# Patient Record
Sex: FEMALE | Race: ASIAN | NOT HISPANIC OR LATINO | Employment: FULL TIME | URBAN - METROPOLITAN AREA
[De-identification: names, ages, dates, MRNs, and addresses within clinical notes are randomized per-mention and may not be internally consistent; named-entity substitution may affect disease eponyms.]

---

## 2018-01-25 ENCOUNTER — CONVERSION ENCOUNTER (OUTPATIENT)
Dept: FAMILY MEDICINE CLINIC | Facility: CLINIC | Age: 38
End: 2018-01-25

## 2018-01-25 ENCOUNTER — OFFICE VISIT CONVERTED (OUTPATIENT)
Dept: FAMILY MEDICINE CLINIC | Facility: CLINIC | Age: 38
End: 2018-01-25
Attending: FAMILY MEDICINE

## 2019-04-26 ENCOUNTER — OFFICE VISIT CONVERTED (OUTPATIENT)
Dept: FAMILY MEDICINE CLINIC | Facility: CLINIC | Age: 39
End: 2019-04-26
Attending: FAMILY MEDICINE

## 2019-05-13 ENCOUNTER — HOSPITAL ENCOUNTER (OUTPATIENT)
Dept: URGENT CARE | Facility: CLINIC | Age: 39
Discharge: HOME | End: 2019-05-13
Attending: PHYSICIAN ASSISTANT

## 2019-05-31 ENCOUNTER — HOSPITAL ENCOUNTER (OUTPATIENT)
Dept: LAB | Facility: HOSPITAL | Age: 39
Discharge: HOME | End: 2019-05-31
Attending: FAMILY MEDICINE

## 2019-05-31 LAB
25(OH)D3 SERPL-MCNC: 22.4 NG/ML (ref 30–100)
ALBUMIN SERPL-MCNC: 4.1 G/DL (ref 3.5–5)
ALBUMIN/GLOB SERPL: 1.2 {RATIO} (ref 1.4–2.6)
ALP SERPL-CCNC: 60 U/L (ref 42–98)
ALT SERPL-CCNC: 14 U/L (ref 10–40)
ANION GAP SERPL CALC-SCNC: 14 MMOL/L (ref 8–19)
APPEARANCE UR: CLEAR
AST SERPL-CCNC: 17 U/L (ref 15–50)
BASOPHILS # BLD AUTO: 0.03 10*3/UL (ref 0–0.2)
BASOPHILS NFR BLD AUTO: 0.7 % (ref 0–3)
BILIRUB SERPL-MCNC: 0.57 MG/DL (ref 0.2–1.3)
BILIRUB UR QL: NEGATIVE
BUN SERPL-MCNC: 13 MG/DL (ref 5–25)
BUN/CREAT SERPL: 16 {RATIO} (ref 6–20)
CALCIUM SERPL-MCNC: 8.7 MG/DL (ref 8.7–10.4)
CHLORIDE SERPL-SCNC: 103 MMOL/L (ref 99–111)
CHOLEST SERPL-MCNC: 136 MG/DL (ref 107–200)
CHOLEST/HDLC SERPL: 1.9 {RATIO} (ref 3–6)
COLOR UR: YELLOW
CONV ABS IMM GRAN: 0.01 10*3/UL (ref 0–0.2)
CONV CO2: 25 MMOL/L (ref 22–32)
CONV COLLECTION SOURCE (UA): NORMAL
CONV IMMATURE GRAN: 0.2 % (ref 0–1.8)
CONV TOTAL PROTEIN: 7.5 G/DL (ref 6.3–8.2)
CONV UROBILINOGEN IN URINE BY AUTOMATED TEST STRIP: 0.2 {EHRLICHU}/DL (ref 0.1–1)
CREAT UR-MCNC: 0.82 MG/DL (ref 0.5–0.9)
DEPRECATED RDW RBC AUTO: 45.9 FL (ref 36.4–46.3)
EOSINOPHIL # BLD AUTO: 0.34 10*3/UL (ref 0–0.7)
EOSINOPHIL # BLD AUTO: 7.6 % (ref 0–7)
ERYTHROCYTE [DISTWIDTH] IN BLOOD BY AUTOMATED COUNT: 13 % (ref 11.7–14.4)
EST. AVERAGE GLUCOSE BLD GHB EST-MCNC: 105 MG/DL
GFR SERPLBLD BASED ON 1.73 SQ M-ARVRAT: >60 ML/MIN/{1.73_M2}
GLOBULIN UR ELPH-MCNC: 3.4 G/DL (ref 2–3.5)
GLUCOSE SERPL-MCNC: 95 MG/DL (ref 65–99)
GLUCOSE UR QL: NEGATIVE MG/DL
HBA1C MFR BLD: 13.3 G/DL (ref 12–16)
HBA1C MFR BLD: 5.3 % (ref 3.5–5.7)
HCT VFR BLD AUTO: 40.1 % (ref 37–47)
HDLC SERPL-MCNC: 70 MG/DL (ref 40–60)
HGB UR QL STRIP: NEGATIVE
IRON SATN MFR SERPL: 53 % (ref 20–55)
IRON SERPL-MCNC: 164 UG/DL (ref 60–170)
KETONES UR QL STRIP: NEGATIVE MG/DL
LDLC SERPL CALC-MCNC: 54 MG/DL (ref 70–100)
LEUKOCYTE ESTERASE UR QL STRIP: NEGATIVE
LYMPHOCYTES # BLD AUTO: 1.81 10*3/UL (ref 1–5)
MCH RBC QN AUTO: 31.8 PG (ref 27–31)
MCHC RBC AUTO-ENTMCNC: 33.2 G/DL (ref 33–37)
MCV RBC AUTO: 95.9 FL (ref 81–99)
MONOCYTES # BLD AUTO: 0.41 10*3/UL (ref 0.2–1.2)
MONOCYTES NFR BLD AUTO: 9.2 % (ref 3–10)
NEUTROPHILS # BLD AUTO: 1.86 10*3/UL (ref 2–8)
NEUTROPHILS NFR BLD AUTO: 41.7 % (ref 30–85)
NITRITE UR QL STRIP: NEGATIVE
NRBC CBCN: 0 % (ref 0–0.7)
OSMOLALITY SERPL CALC.SUM OF ELEC: 286 MOSM/KG (ref 273–304)
PH UR STRIP.AUTO: 6.5 [PH] (ref 5–8)
PLATELET # BLD AUTO: 205 10*3/UL (ref 130–400)
PMV BLD AUTO: 10.3 FL (ref 9.4–12.3)
POTASSIUM SERPL-SCNC: 3.9 MMOL/L (ref 3.5–5.3)
PROT UR QL: NEGATIVE MG/DL
RBC # BLD AUTO: 4.18 10*6/UL (ref 4.2–5.4)
SODIUM SERPL-SCNC: 138 MMOL/L (ref 135–147)
SP GR UR: 1.01 (ref 1–1.03)
TIBC SERPL-MCNC: 309 UG/DL (ref 245–450)
TRANSFERRIN SERPL-MCNC: 216 MG/DL (ref 250–380)
TRIGL SERPL-MCNC: 61 MG/DL (ref 40–150)
TSH SERPL-ACNC: 1.58 M[IU]/L (ref 0.27–4.2)
VARIANT LYMPHS NFR BLD MANUAL: 40.6 % (ref 20–45)
VLDLC SERPL-MCNC: 12 MG/DL (ref 5–37)
WBC # BLD AUTO: 4.46 10*3/UL (ref 4.8–10.8)

## 2019-11-01 ENCOUNTER — CONVERSION ENCOUNTER (OUTPATIENT)
Dept: FAMILY MEDICINE CLINIC | Facility: CLINIC | Age: 39
End: 2019-11-01

## 2019-11-01 ENCOUNTER — OFFICE VISIT CONVERTED (OUTPATIENT)
Dept: FAMILY MEDICINE CLINIC | Facility: CLINIC | Age: 39
End: 2019-11-01
Attending: FAMILY MEDICINE

## 2020-01-10 ENCOUNTER — OFFICE VISIT CONVERTED (OUTPATIENT)
Dept: FAMILY MEDICINE CLINIC | Facility: CLINIC | Age: 40
End: 2020-01-10
Attending: FAMILY MEDICINE

## 2020-03-13 ENCOUNTER — OFFICE VISIT CONVERTED (OUTPATIENT)
Dept: FAMILY MEDICINE CLINIC | Facility: CLINIC | Age: 40
End: 2020-03-13
Attending: FAMILY MEDICINE

## 2020-03-13 ENCOUNTER — CONVERSION ENCOUNTER (OUTPATIENT)
Dept: FAMILY MEDICINE CLINIC | Facility: CLINIC | Age: 40
End: 2020-03-13

## 2020-06-29 ENCOUNTER — OFFICE VISIT CONVERTED (OUTPATIENT)
Dept: FAMILY MEDICINE CLINIC | Facility: CLINIC | Age: 40
End: 2020-06-29
Attending: FAMILY MEDICINE

## 2020-09-22 ENCOUNTER — HOSPITAL ENCOUNTER (OUTPATIENT)
Dept: GENERAL RADIOLOGY | Facility: HOSPITAL | Age: 40
Discharge: HOME | End: 2020-09-22
Attending: FAMILY MEDICINE

## 2020-10-06 ENCOUNTER — OFFICE VISIT CONVERTED (OUTPATIENT)
Dept: FAMILY MEDICINE CLINIC | Facility: CLINIC | Age: 40
End: 2020-10-06
Attending: FAMILY MEDICINE

## 2020-10-06 ENCOUNTER — HOSPITAL ENCOUNTER (OUTPATIENT)
Dept: FAMILY MEDICINE CLINIC | Facility: CLINIC | Age: 40
Discharge: HOME | End: 2020-10-06
Attending: FAMILY MEDICINE

## 2020-10-06 ENCOUNTER — CONVERSION ENCOUNTER (OUTPATIENT)
Dept: FAMILY MEDICINE CLINIC | Facility: CLINIC | Age: 40
End: 2020-10-06

## 2020-10-06 ENCOUNTER — HOSPITAL ENCOUNTER (OUTPATIENT)
Dept: LAB | Facility: HOSPITAL | Age: 40
Discharge: HOME | End: 2020-10-06
Attending: FAMILY MEDICINE

## 2020-10-07 LAB
25(OH)D3 SERPL-MCNC: 32.8 NG/ML (ref 30–100)
ALBUMIN SERPL-MCNC: 4 G/DL (ref 3.5–5)
ALBUMIN/GLOB SERPL: 1.3 {RATIO} (ref 1.4–2.6)
ALP SERPL-CCNC: 62 U/L (ref 42–98)
ALT SERPL-CCNC: 19 U/L (ref 10–40)
ANION GAP SERPL CALC-SCNC: 18 MMOL/L (ref 8–19)
APPEARANCE UR: ABNORMAL
AST SERPL-CCNC: 19 U/L (ref 15–50)
BASOPHILS # BLD AUTO: 0.03 10*3/UL (ref 0–0.2)
BASOPHILS NFR BLD AUTO: 0.7 % (ref 0–3)
BILIRUB SERPL-MCNC: 0.77 MG/DL (ref 0.2–1.3)
BILIRUB UR QL: NEGATIVE
BUN SERPL-MCNC: 11 MG/DL (ref 5–25)
BUN/CREAT SERPL: 12 {RATIO} (ref 6–20)
CALCIUM SERPL-MCNC: 8.9 MG/DL (ref 8.7–10.4)
CHLORIDE SERPL-SCNC: 104 MMOL/L (ref 99–111)
CHOLEST SERPL-MCNC: 125 MG/DL (ref 107–200)
CHOLEST/HDLC SERPL: 2 {RATIO} (ref 3–6)
COLOR UR: YELLOW
CONV ABS IMM GRAN: 0.01 10*3/UL (ref 0–0.2)
CONV BACTERIA: NEGATIVE
CONV CO2: 22 MMOL/L (ref 22–32)
CONV COLLECTION SOURCE (UA): ABNORMAL
CONV HYALINE CASTS IN URINE MICRO: ABNORMAL /[LPF]
CONV IMMATURE GRAN: 0.2 % (ref 0–1.8)
CONV TOTAL PROTEIN: 7.1 G/DL (ref 6.3–8.2)
CONV UROBILINOGEN IN URINE BY AUTOMATED TEST STRIP: 1 {EHRLICHU}/DL (ref 0.1–1)
CREAT UR-MCNC: 0.89 MG/DL (ref 0.5–0.9)
DEPRECATED RDW RBC AUTO: 44.1 FL (ref 36.4–46.3)
EOSINOPHIL # BLD AUTO: 0.41 10*3/UL (ref 0–0.7)
EOSINOPHIL # BLD AUTO: 9.5 % (ref 0–7)
ERYTHROCYTE [DISTWIDTH] IN BLOOD BY AUTOMATED COUNT: 12.5 % (ref 11.7–14.4)
EST. AVERAGE GLUCOSE BLD GHB EST-MCNC: 100 MG/DL
GFR SERPLBLD BASED ON 1.73 SQ M-ARVRAT: >60 ML/MIN/{1.73_M2}
GLOBULIN UR ELPH-MCNC: 3.1 G/DL (ref 2–3.5)
GLUCOSE SERPL-MCNC: 93 MG/DL (ref 65–99)
GLUCOSE UR QL: NEGATIVE MG/DL
HBA1C MFR BLD: 5.1 % (ref 3.5–5.7)
HCT VFR BLD AUTO: 38.8 % (ref 37–47)
HDLC SERPL-MCNC: 64 MG/DL (ref 40–60)
HGB BLD-MCNC: 12.7 G/DL (ref 12–16)
HGB UR QL STRIP: NEGATIVE
IRON SATN MFR SERPL: 69 % (ref 20–55)
IRON SERPL-MCNC: 189 UG/DL (ref 60–170)
KETONES UR QL STRIP: NEGATIVE MG/DL
LDLC SERPL CALC-MCNC: 47 MG/DL (ref 70–100)
LEUKOCYTE ESTERASE UR QL STRIP: ABNORMAL
LYMPHOCYTES # BLD AUTO: 1.66 10*3/UL (ref 1–5)
LYMPHOCYTES NFR BLD AUTO: 38.4 % (ref 20–45)
MCH RBC QN AUTO: 31.2 PG (ref 27–31)
MCHC RBC AUTO-ENTMCNC: 32.7 G/DL (ref 33–37)
MCV RBC AUTO: 95.3 FL (ref 81–99)
MONOCYTES # BLD AUTO: 0.37 10*3/UL (ref 0.2–1.2)
MONOCYTES NFR BLD AUTO: 8.6 % (ref 3–10)
NEUTROPHILS # BLD AUTO: 1.84 10*3/UL (ref 2–8)
NEUTROPHILS NFR BLD AUTO: 42.6 % (ref 30–85)
NITRITE UR QL STRIP: NEGATIVE
NRBC CBCN: 0 % (ref 0–0.7)
OSMOLALITY SERPL CALC.SUM OF ELEC: 289 MOSM/KG (ref 273–304)
PH UR STRIP.AUTO: 7 [PH] (ref 5–8)
PLATELET # BLD AUTO: 211 10*3/UL (ref 130–400)
PMV BLD AUTO: 10.2 FL (ref 9.4–12.3)
POTASSIUM SERPL-SCNC: 3.8 MMOL/L (ref 3.5–5.3)
PROT UR QL: NEGATIVE MG/DL
RBC # BLD AUTO: 4.07 10*6/UL (ref 4.2–5.4)
RBC #/AREA URNS HPF: ABNORMAL /[HPF]
SODIUM SERPL-SCNC: 140 MMOL/L (ref 135–147)
SP GR UR: 1.02 (ref 1–1.03)
SQUAMOUS SPT QL MICRO: ABNORMAL /[HPF]
TIBC SERPL-MCNC: 272 UG/DL (ref 245–450)
TRANSFERRIN SERPL-MCNC: 190 MG/DL (ref 250–380)
TRIGL SERPL-MCNC: 68 MG/DL (ref 40–150)
TSH SERPL-ACNC: 0.03 M[IU]/L (ref 0.27–4.2)
VLDLC SERPL-MCNC: 14 MG/DL (ref 5–37)
WBC # BLD AUTO: 4.32 10*3/UL (ref 4.8–10.8)
WBC #/AREA URNS HPF: ABNORMAL /[HPF]

## 2020-10-09 LAB
CONV LAST MENSTURAL PERIOD: NORMAL
SPECIMEN SOURCE: NORMAL
SPECIMEN SOURCE: NORMAL
THIN PREP CVX: NORMAL

## 2020-11-02 ENCOUNTER — HOSPITAL ENCOUNTER (OUTPATIENT)
Dept: LAB | Facility: HOSPITAL | Age: 40
Discharge: HOME | End: 2020-11-02
Attending: FAMILY MEDICINE

## 2020-11-02 LAB
APPEARANCE UR: CLEAR
BASOPHILS # BLD AUTO: 0.03 10*3/UL (ref 0–0.2)
BASOPHILS NFR BLD AUTO: 0.8 % (ref 0–3)
BILIRUB UR QL: NEGATIVE
COLOR UR: YELLOW
CONV ABS IMM GRAN: 0 10*3/UL (ref 0–0.2)
CONV COLLECTION SOURCE (UA): ABNORMAL
CONV IMMATURE GRAN: 0 % (ref 0–1.8)
CONV UROBILINOGEN IN URINE BY AUTOMATED TEST STRIP: 0.2 {EHRLICHU}/DL (ref 0.1–1)
DEPRECATED RDW RBC AUTO: 44 FL (ref 36.4–46.3)
EOSINOPHIL # BLD AUTO: 0.3 10*3/UL (ref 0–0.7)
EOSINOPHIL # BLD AUTO: 8.3 % (ref 0–7)
ERYTHROCYTE [DISTWIDTH] IN BLOOD BY AUTOMATED COUNT: 12.8 % (ref 11.7–14.4)
FERRITIN SERPL-MCNC: 137 NG/ML (ref 10–200)
GLUCOSE UR QL: NEGATIVE MG/DL
HCT VFR BLD AUTO: 43.4 % (ref 37–47)
HGB BLD-MCNC: 14.3 G/DL (ref 12–16)
HGB UR QL STRIP: NEGATIVE
KETONES UR QL STRIP: ABNORMAL MG/DL
LEUKOCYTE ESTERASE UR QL STRIP: NEGATIVE
LYMPHOCYTES # BLD AUTO: 1.26 10*3/UL (ref 1–5)
LYMPHOCYTES NFR BLD AUTO: 34.9 % (ref 20–45)
MCH RBC QN AUTO: 31.1 PG (ref 27–31)
MCHC RBC AUTO-ENTMCNC: 32.9 G/DL (ref 33–37)
MCV RBC AUTO: 94.3 FL (ref 81–99)
MONOCYTES # BLD AUTO: 0.28 10*3/UL (ref 0.2–1.2)
MONOCYTES NFR BLD AUTO: 7.8 % (ref 3–10)
NEUTROPHILS # BLD AUTO: 1.74 10*3/UL (ref 2–8)
NEUTROPHILS NFR BLD AUTO: 48.2 % (ref 30–85)
NITRITE UR QL STRIP: NEGATIVE
NRBC CBCN: 0 % (ref 0–0.7)
PH UR STRIP.AUTO: 6 [PH] (ref 5–8)
PLATELET # BLD AUTO: 201 10*3/UL (ref 130–400)
PMV BLD AUTO: 10.9 FL (ref 9.4–12.3)
PROT UR QL: NEGATIVE MG/DL
RBC # BLD AUTO: 4.6 10*6/UL (ref 4.2–5.4)
SP GR UR: 1.01 (ref 1–1.03)
T4 FREE SERPL-MCNC: 1.2 NG/DL (ref 0.9–1.8)
TSH SERPL-ACNC: 1 M[IU]/L (ref 0.27–4.2)
WBC # BLD AUTO: 3.61 10*3/UL (ref 4.8–10.8)

## 2020-11-03 LAB — T3FREE SERPL-MCNC: 2.6 PG/ML (ref 2–4.4)

## 2020-11-04 LAB — BACTERIA UR CULT: NORMAL

## 2020-11-09 LAB
CONV THYROGLOBULIN ANTIBODY: 11.3 IU/ML (ref 0–0.9)
THYROGLOB SERPL-MCNC: 22 NG/ML

## 2020-11-17 ENCOUNTER — CONVERSION ENCOUNTER (OUTPATIENT)
Dept: FAMILY MEDICINE CLINIC | Facility: CLINIC | Age: 40
End: 2020-11-17

## 2020-11-17 ENCOUNTER — OFFICE VISIT CONVERTED (OUTPATIENT)
Dept: FAMILY MEDICINE CLINIC | Facility: CLINIC | Age: 40
End: 2020-11-17
Attending: STUDENT IN AN ORGANIZED HEALTH CARE EDUCATION/TRAINING PROGRAM

## 2021-04-09 ENCOUNTER — HOSPITAL ENCOUNTER (OUTPATIENT)
Dept: LAB | Facility: HOSPITAL | Age: 41
Discharge: HOME | End: 2021-04-09
Attending: FAMILY MEDICINE

## 2021-04-09 ENCOUNTER — OFFICE VISIT CONVERTED (OUTPATIENT)
Dept: FAMILY MEDICINE CLINIC | Facility: CLINIC | Age: 41
End: 2021-04-09
Attending: FAMILY MEDICINE

## 2021-04-09 LAB
BASOPHILS # BLD AUTO: 0.02 10*3/UL (ref 0–0.2)
BASOPHILS NFR BLD AUTO: 0.4 % (ref 0–3)
CONV ABS IMM GRAN: 0.01 10*3/UL (ref 0–0.2)
CONV IMMATURE GRAN: 0.2 % (ref 0–1.8)
DEPRECATED RDW RBC AUTO: 46.7 FL (ref 36.4–46.3)
EOSINOPHIL # BLD AUTO: 0.39 10*3/UL (ref 0–0.7)
EOSINOPHIL # BLD AUTO: 8.3 % (ref 0–7)
ERYTHROCYTE [DISTWIDTH] IN BLOOD BY AUTOMATED COUNT: 13.1 % (ref 11.7–14.4)
HCT VFR BLD AUTO: 37.5 % (ref 37–47)
HGB BLD-MCNC: 12.1 G/DL (ref 12–16)
IRON SATN MFR SERPL: 26 % (ref 20–55)
IRON SERPL-MCNC: 80 UG/DL (ref 60–170)
LYMPHOCYTES # BLD AUTO: 1.67 10*3/UL (ref 1–5)
LYMPHOCYTES NFR BLD AUTO: 35.7 % (ref 20–45)
MCH RBC QN AUTO: 31.7 PG (ref 27–31)
MCHC RBC AUTO-ENTMCNC: 32.3 G/DL (ref 33–37)
MCV RBC AUTO: 98.2 FL (ref 81–99)
MONOCYTES # BLD AUTO: 0.43 10*3/UL (ref 0.2–1.2)
MONOCYTES NFR BLD AUTO: 9.2 % (ref 3–10)
NEUTROPHILS # BLD AUTO: 2.16 10*3/UL (ref 2–8)
NEUTROPHILS NFR BLD AUTO: 46.2 % (ref 30–85)
NRBC CBCN: 0 % (ref 0–0.7)
PLATELET # BLD AUTO: 198 10*3/UL (ref 130–400)
PMV BLD AUTO: 10.3 FL (ref 9.4–12.3)
RBC # BLD AUTO: 3.82 10*6/UL (ref 4.2–5.4)
TIBC SERPL-MCNC: 306 UG/DL (ref 245–450)
TRANSFERRIN SERPL-MCNC: 214 MG/DL (ref 250–380)
TSH SERPL-ACNC: 1.13 M[IU]/L (ref 0.27–4.2)
WBC # BLD AUTO: 4.68 10*3/UL (ref 4.8–10.8)

## 2021-05-14 VITALS
HEIGHT: 65 IN | DIASTOLIC BLOOD PRESSURE: 73 MMHG | WEIGHT: 151.12 LBS | TEMPERATURE: 97.4 F | HEART RATE: 84 BPM | OXYGEN SATURATION: 96 % | BODY MASS INDEX: 25.18 KG/M2 | SYSTOLIC BLOOD PRESSURE: 122 MMHG | RESPIRATION RATE: 18 BRPM

## 2021-05-14 VITALS
HEIGHT: 65 IN | WEIGHT: 144 LBS | HEART RATE: 65 BPM | DIASTOLIC BLOOD PRESSURE: 68 MMHG | TEMPERATURE: 97 F | BODY MASS INDEX: 23.99 KG/M2 | OXYGEN SATURATION: 100 % | SYSTOLIC BLOOD PRESSURE: 108 MMHG | RESPIRATION RATE: 18 BRPM

## 2021-05-14 VITALS
HEIGHT: 65 IN | BODY MASS INDEX: 23.66 KG/M2 | RESPIRATION RATE: 16 BRPM | DIASTOLIC BLOOD PRESSURE: 62 MMHG | SYSTOLIC BLOOD PRESSURE: 108 MMHG | HEART RATE: 72 BPM | OXYGEN SATURATION: 98 % | TEMPERATURE: 97.3 F | WEIGHT: 142 LBS

## 2021-05-14 NOTE — PROGRESS NOTES
Progress Note      Patient Name: Adore Rodriguez   Patient ID: [id number]   Sex: Female   YOB: 1980    Primary Care Provider: Mika Palm MD    Visit Date: 2020    Provider: Ernesto Machuca MD   Location: [facility] [place]   Location Address: [address] [place] [address], [address] [place] [address]  [id number]   Location Phone: [phone]          Chief Complaint     Pt here today for sore throat.       History Of Present Illness  Adore Rodriguez is a 40 year old  female who presents for evaluation and treatment of:      40 years old female comes to the clinic today for an acute visit.    Patient comes to the clinic complaining of throat irritation for few days.  Patient reports mild discomfort with swallowing but denies any difficulty swallowing.  Denies any fever, ear pain or any facial pain.  Patient reports history of severe seasonal allergies.  Patient denies any exposure to COVID-19, denies any shortness of breath cough, anosmia or any GI side effects.       Past Medical History  Disease Name Date Onset Notes   Alcoholism --  --    Allergies --  --    Anemia --  --    Arthritis --  --    Migraine headache --  --    Positive TB test  --    Sinus trouble --  --          Medication List  Name Date Started Instructions   hydroxyzine HCl 25 mg oral tablet 2020 take 1-2 tablets by oral route once a day (at bedtime)         Allergy List  Allergen Name Date Reaction Notes   NO KNOWN DRUG ALLERGIES --  --  --          Family Medical History  Disease Name Relative/Age Notes   DM Type II Uncle/   --    Leukemia Sister/   --          Reproductive History  Menstrual   Last Menstrual Period: 2018   Pregnancy Summary   Total Pregnancies: 1 Full Term: 1 Premature: 0   Ab Induced: 0 Ab Spontaneous: 0 Ectopics: 0   Multiples: 0 Livin         Social History  Finding Status Start/Stop Quantity Notes   Tobacco Never --/-- --  --          Immunizations  NameDate Admin  "Mfg Trade Name Lot Number Route Inj VIS Given VIS Publication   Lxhfchzwr07/14/2019 NE Not Entered  NE NE     Comments: received vaccine at work   MMR08/31/2017 MSD M-M-R II  NE NE 01/25/2018    Comments:    Tdap08/31/2017 SKB BOOSTRIX  NE NE 01/25/2018 01/24/2012   Comments:    Dzwsitvvw36/31/2017 MSD VARIVAX  NE NE 01/25/2018 10/14/2011   Comments:    Ftdmmqjof45/22/1981 NE Not Entered  NE NE     Comments:          Review of Systems  · Constitutional  o Denies  o : fatigue, fever  · Eyes  o Denies  o : discharge from eye, redness  · HENT  o Admits  o : sore throat  o Denies  o : headaches, congestion  · Cardiovascular  o Denies  o : chest pain, palpitations  · Respiratory  o Denies  o : shortness of breath, wheezing, cough  · Gastrointestinal  o Denies  o : vomiting, diarrhea, constipation  · Genitourinary  o Denies  o : dysuria, hematuria  · Integument  o Denies  o : rash, new skin lesions  · Neurologic  o Denies  o : altered mental status, seizures  · Musculoskeletal  o Denies  o : weakness, joint swelling  · Psychiatric  o Denies  o : anxiety, depression  · Heme-Lymph  o Denies  o : lymph node enlargement, tenderness      Vitals  Date Time BP Position Site L\R Cuff Size HR RR TEMP (F) WT  HT  BMI kg/m2 BSA m2 O2 Sat FR L/min FiO2        11/17/2020 03:02 /62 Sitting    72 - R 16 97.3 142lbs 0oz 5'  5\" 23.63 1.72 98 %  21%          Physical Examination  · Constitutional  o Appearance  o : alert, in no acute distress, well developed, well-nourished  · Head and Face  o Head  o : normocephalic, atraumatic, non tender, no palpable masses or nodules.  o Face  o : no facial lesions  · Eyes  o Vision  o : Acuity: grossly normal at distance, Conjuntivae: Normal, Sclerae white, Pupils: PERRL, Cornea: Clear, no lesions bilateral  · Ears, Nose, Mouth and Throat  o Ears  o : Ext. Ears: Normal shape, Non tender, EACs: Normal , TMs: Normal, Hearing: intact to conversational voice bilaterally  o Nose  o : Nose: Normal " shape, No external deformity, No nasal discharge, Mucosa: Mildly inflamed, septum: midline, Sinuses: Nontender   o Oral Cavity  o : Normal oral mucosa, Normal lips, Gums: normal pink, non swollen, Tongue: Normal appearance, Palate : Normal   o Throat  o : Oropharynx: Mildly inflamed and swollen, Tonsils: within normal limits  · Neck  o Inspection/Palpation  o : Supple, no masses or tenderness, no deformities, Trachea: Midline, ROM: with in normal limits, no neck stiffness  o Thyroid  o : no thyomegaly, no palpabale masses   · Respiratory  o Auscultation of Lungs  o : normal breath sounds throughout  · Cardiovascular  o Heart  o : Regular rate and rhythm, Normal S1,S2 , No cardiac murmers, No S3 or S4 gallop or rubs  · Gastrointestinal  o Abdominal Examination  o : abdomen soft, nontender, non distended, no rigidity, gaurding, rebound tenderness, no ventral or inguinal hernias present  o Liver and spleen  o : no hepatomegaly present, liver nontender to palpation, spleen not palpable  · Neurologic  o Mental Status Examination  o : alert and oriented to time, place, and person., Cranial Nerves: grossly intact,   · Psychiatric  o Mood and Affect  o : normal mood and affect          Assessment  · PND (paroxysmal nocturnal dyspnea)     786.09/R06.00  --Flonase daily  · Throat irritation     478.29/J39.2  --Likely related to her allergies and postnasal drip   --return to the clinic if worsening of the symptoms or no improvement in 2 to 3 days      Plan  · Orders  o ACO-39: Current medications updated and reviewed (, 1159F) - - 11/17/2020  o ACO-14: Influenza immunization administered or previously received [facility] () - - 11/17/2020  · Medications  o Cepacol Sorethroat-Cough 5-7.5 mg oral lozenge   SIG: dissolve 1 lozenge by oral route every 3 hours as needed for 5 days   DISP: (20) Lozenge with 0 refills  Prescribed on 11/17/2020     o Flonase Allergy Relief 50 mcg/actuation nasal spray,suspension   SIG: spray 1 - 2  sprays (50 - 100 mcg) in each nostril by intranasal route once daily as needed for 30 days   DISP: (1) Puff with 0 refills  Prescribed on 11/17/2020     o Medications have been Reconciled  o Transition of Care or Provider Policy  · Instructions  o Patient was educated/instructed on their diagnosis, treatment and medications prior to discharge from the clinic today.  o Patient was instructed to exercise regularly.  o Patient instructed to seek medical attention urgently for new or worsening symptoms.  o Call the office with any concerns or questions.  o Minutes spent with patient including greater than 50% in Education/Counseling/Care Coordination.  o Time spent with the patient was minutes, more than 50% face to face.  o Discussed Covid-19 precautions including, but not limited to, social distancing, avoid touching your face, and hand washing.   · Disposition  o Call or Return if symptoms worsen or persist.  o Follow Up PRN.  o Follow Up in 1 week.            Electronically Signed by: Ernesto Machuca MD -Author on November 17, 2020 03:22:47 PM

## 2021-05-14 NOTE — PROGRESS NOTES
Progress Note      Patient Name: Adore Sherman   Patient ID: [id number]   Sex: Female   YOB: 1980    Primary Care Provider: Mika Palm MD    Visit Date: 2021    Provider: Mika Palm MD   Location: [facility] [place]   Location Address: [address] [place] [address], [address]  [id number]   Location Phone: [phone]          Chief Complaint     6 month follow up       History Of Present Illness  Adore Sherman is a 40 year old  female who presents for evaluation and treatment of:      hx of insomnia..She is on hydroxyzine prn.     The WBC count was mildly low last visit. Needs to be monitored.    The thyroid was abnormal in Oct but normalized in Nov. Needs to be monitored.           Past Medical History  Disease Name Date Onset Notes   Alcoholism --  --    Allergies --  --    Anemia --  --    Arthritis --  --    Migraine headache --  --    Positive TB test  --    Sinus trouble --  --          Medication List  Name Date Started Instructions   Flonase Allergy Relief 50 mcg/actuation nasal spray,suspension 2021 spray 1 - 2 sprays (50 - 100 mcg) in each nostril by intranasal route once daily as needed for 30 days   hydroxyzine HCl 25 mg oral tablet 2021 take 1-2 tablets by oral route once a day (at bedtime)         Allergy List  Allergen Name Date Reaction Notes   NO KNOWN DRUG ALLERGIES --  --  --        Allergies Reconciled  Family Medical History  Disease Name Relative/Age Notes   DM Type II Uncle/   --    Leukemia Sister/   --          Reproductive History  Menstrual   Last Menstrual Period: 2018   Pregnancy Summary   Total Pregnancies: 1 Full Term: 1 Premature: 0   Ab Induced: 0 Ab Spontaneous: 0 Ectopics: 0   Multiples: 0 Livin         Social History  Finding Status Start/Stop Quantity Notes   Tobacco Never --/-- --  --          Immunizations  NameDate Admin Mfg Trade Name Lot Number Route Inj VIS Given VIS Publication  "  COVID Ovhqvil1203/09/2021 MOD Moderna COVID-19 Vaccine  NE NE 04/09/2021    Comments:    COVID Djyloba1302/09/2021 MOD Moderna COVID-19 Vaccine  NE NE 04/09/2021    Comments:    Cnuftrtuj68/14/2019 NE Not Entered  NE NE     Comments: received vaccine at work   MMR08/31/2017 MSD M-M-R II  NE NE 01/25/2018    Comments:    Tdap08/31/2017 SKB BOOSTRIX  NE NE 01/25/2018 01/24/2012   Comments:    Uqpztxpnx79/31/2017 MSD VARIVAX  NE NE 01/25/2018 10/14/2011   Comments:    Eaqgwnima37/22/1981 NE Not Entered  NE NE     Comments:          Review of Systems  · Constitutional  o Denies  o : fever, weight loss, weight gain  · Cardiovascular  o Denies  o : pedal edema, claudication, chest pressure, palpitations  · Respiratory  o Denies  o : shortness of breath, wheezing, cough, hemoptysis, dyspnea on exertion  · Gastrointestinal  o Denies  o : nausea, vomiting, diarrhea, constipation, abdominal pain      Vitals  Date Time BP Position Site L\R Cuff Size HR RR TEMP (F) WT  HT  BMI kg/m2 BSA m2 O2 Sat FR L/min FiO2        04/09/2021 01:20 /73 Sitting    84 - R 18 97.4 151lbs 2oz 5'  5\" 25.15 1.77 96 %  21%          Physical Examination  · Constitutional  o Appearance  o : alert, in no acute distress, well developed, well-nourished  · Head and Face  o Head  o : normocephalic, atraumatic, non tender, no palpable masses or nodules.  o Face  o : no facial lesions  · Eyes  o Vision  o : Acuity: grossly normal at distance, Conjuntivae: Normal, Sclerae white  · Respiratory  o Auscultation of Lungs  o : normal breath sounds throughout  · Cardiovascular  o Heart  o : Regular rate and rhythm, Normal S1,S2   · Psychiatric  o Mood and Affect  o : normal mood and affect          Assessment  · Abnormal thyroid function test     794.5/R94.6  · Abnormal CBC     790.6/R79.89  · Anemia     285.9/D64.9       monitor thhyroid and CBC level.  if cbc continues to trend down... she will be sent to hematologist.          Plan  · Orders  o CBC with " Auto Diff [facility] (66827) - 790.6/R79.89 - 04/09/2021  o TSH [facility] (17549) - 794.5/R94.6 - 04/09/2021  o Iron Profile (Iron 31132 TIBC 45924 and Transferrin 71400) (IRONP) - 285.9/D64.9 - 04/09/2021  o ACO-14: Influenza immunization administered or previously received () - - 04/29/2021  o ACO-39: Current medications updated and reviewed (1159F, ) - - 04/29/2021  · Medications  o Cepacol Sorethroat-Cough 5-7.5 mg oral lozenge   SIG: dissolve 1 lozenge by oral route every 3 hours as needed for 5 days   DISP: (20) Lozenge with 0 refills  Discontinued on 04/09/2021     o Medications have been Reconciled  o Transition of Care or Provider Policy  · Instructions  o Electronically Identified Patient Education Materials Provided Electronically  · Disposition  o Call or Return if symptoms worsen or persist.  o Care Transition            Electronically Signed by: Mika Palm MD -Author on April 29, 2021 09:48:06 AM

## 2021-05-14 NOTE — PROGRESS NOTES
Progress Note      Patient Name: Adore Rodriguez   Patient ID: [id number]   Sex: Female   YOB: 1980    Primary Care Provider: Mika Palm MD    Visit Date: 2020    Provider: Mika Palm MD   Location: [facility] [place]   Location Address: [address] [place] [address], [address]  [id number]   Location Phone: [phone]          Chief Complaint  · Annual Exam  · PAP exam  · (Health Maintainence Information Reviewed Under Results)      History Of Present Illness  Adore Rodriguez is a 40 year old  female who presents for evaluation and treatment of:   Last PAP Smear: .   Date of Last Mammogram: 2022.   Date of Last Colonoscopy: never       Past Medical History  Disease Name Date Onset Notes   Alcoholism --  --    Allergies --  --    Anemia --  --    Arthritis --  --    Migraine headache --  --    Positive TB test  --    Sinus trouble --  --          Medication List  Name Date Started Instructions   hydroxyzine HCl 25 mg oral tablet 2020 take 1-2 tablets by oral route once a day (at bedtime)         Allergy List  Allergen Name Date Reaction Notes   NO KNOWN DRUG ALLERGIES --  --  --        Allergies Reconciled  Family Medical History  Disease Name Relative/Age Notes   DM Type II Uncle/   --    Leukemia Sister/   --          Reproductive History  Menstrual   Last Menstrual Period: 2018   Pregnancy Summary   Total Pregnancies: 1 Full Term: 1 Premature: 0   Ab Induced: 0 Ab Spontaneous: 0 Ectopics: 0   Multiples: 0 Livin         Social History  Finding Status Start/Stop Quantity Notes   Tobacco Never --/-- --  --          Immunizations  NameDate Admin Mfg Trade Name Lot Number Route Inj VIS Given VIS Publication   Zgbdeprbh58/ NE Not Entered  NE NE     Comments: received vaccine at work   MMR2017 MSD M-M-R II  NE NE 2018    Comments:    Tdap2017 SKB BOOSTRIX  NE NE 2018   Comments:   "  Fxfqhrqcl96/31/2017 MSD VARIVAX  NE NE 01/25/2018 10/14/2011   Comments:    Ijltgrrhl63/22/1981 NE Not Entered  NE NE     Comments:          Review of Systems  · Constitutional  o Denies  o : night sweats  · Eyes  o Denies  o : double vision, blurred vision  · HENT  o Denies  o : vertigo, recent head injury  · Breasts  o Denies  o : abnormal changes in breast size, additional breast symptoms except as noted in the HPI  · Cardiovascular  o Denies  o : chest pain, irregular heart beats  · Respiratory  o Denies  o : shortness of breath, productive cough  · Gastrointestinal  o Denies  o : nausea, vomiting  · Genitourinary  o Denies  o : dysuria, urinary retention  · Integument  o Denies  o : hair growth change, new skin lesions  · Neurologic  o Denies  o : altered mental status, seizures  · Musculoskeletal  o Denies  o : joint swelling, limitation of motion  · Endocrine  o Denies  o : cold intolerance, heat intolerance  · Heme-Lymph  o Denies  o : petechiae, lymph node enlargement or tenderness  · Allergic-Immunologic  o Denies  o : frequent illnesses      Vitals  Date Time BP Position Site L\R Cuff Size HR RR TEMP (F) WT  HT  BMI kg/m2 BSA m2 O2 Sat FR L/min FiO2 HC       10/06/2020 02:53 /68 Sitting    65 - R 18 97 144lbs 0oz 5'  5\" 23.96 1.73 100 %  21%          Physical Examination  · Constitutional  o Appearance  o : well-nourished, in no acute distress  · Neck  o Inspection/Palpation  o : normal appearance, no masses or tenderness, trachea midline  o Thyroid  o : gland size normal, nontender, no nodules or masses present on palpation  · Respiratory  o Respiratory Effort  o : breathing unlabored  o Inspection of Chest  o : normal appearance  o Auscultation of Lungs  o : normal breath sounds throughout  · Cardiovascular  o Heart  o :   § Auscultation of Heart  § : regular rate and rhythm  · Breasts  o Inspection of Breasts  o : breasts symmetrical, no skin changes, no deformities present, no discharge " present  o Palpation of Breasts, Axillae  o : no masses present on palpation, no breast tenderness  · Gastrointestinal  o Abdominal Examination  o : abdomen nontender to palpation, tone normal without rigidity or guarding, no masses present, normal bowel sounds  · Genitourinary  o External Genitalia  o : no inflammation, no lesions present  o Vagina  o : normal vaginal vault, no discharge present, no inflammatory lesions present, no masses present  o Bladder  o : nontender to palpation  o Cervix  o : appearance healthy, no lesions present, nontender to palpation, no discharges, no bleeding present, normal midline position, pap smear perfoemed.  o Uterus  o : nontender to palpation, no masses present, position midline/midplane  o Adnexa  o : no tenderness or masses present on bimanual examination  o Anus  o : no inflammation or lesions present  o Perineum  o : perineum within normal limits  · Psychiatric  o Judgement and Insight  o : judgment and insight intact  o Mood and Affect  o : mood normal, affect appropriate          Assessment  · Annual physical exam     V70.0/Z00.00  · Routine gynecological examination     V72.31/Z01.419  · Pap smear, as part of routine gynecological examination     V76.2/Z01.419       f/u as directed.  labs tomorrow.  flu she will get at work.             Plan  · Orders  o Pap smear (53720) - V76.2/Z01.419 - 10/06/2020  o ACO-39: Current medications updated and reviewed (1159F, ) - - 10/06/2020  o ACO-14: Influenza immunization administered or previously received [place] () - - 10/06/2020  · Medications  o Medications have been Reconciled  o Transition of Care or Provider Policy  · Instructions  o Reviewed health maintenance flowsheet and updated information. Orders were placed and/or patient's response was documented.  o **Pap Test/Liquid Based:   o Thin Prep  o Source:  o Cervix  o ********  o **Perform Reflex Human Papilloma Virus (HPV) High Risk on this Pap (If atypical squamous  cells of the undetermined signifigcance (ASCUS)/Atypical Glandular Cells of undetermined significance (AGCUS): Low Grade Squamous Intraepitheal lesion (LGSIL): *yes*  o No  o ********  o **Is this an annual PAP:  o Yes  o Patient was educated/instructed on their diagnosis, treatment and medications prior to discharge from the clinic today.  o Counseled on monthly breast self exams.   o Counseled on STD prevention.  o Counseled on diet and exercise.   o Counseled on weight-bearing exercise.  o Recommended Calcium with Vitamin D twice daily.  o Electronically Identified Patient Education Materials Provided Electronically  · Disposition  o Call or Return if symptoms worsen or persist.  o Care Transition            Electronically Signed by: Mika Palm MD -Author on October 6, 2020 05:36:52 PM

## 2021-05-14 NOTE — PROGRESS NOTES
Progress Note      Patient Name: Adore Rodriguez   Patient ID: [id number]   Sex: Female   YOB: 1980    Primary Care Provider: Mika Palm MD    Visit Date: 2020    Provider: Mika Palm MD   Location: [facility]   Location Address: [address] [place] [address], [address]  [id number]   Location Phone: [phone]          Chief Complaint     3 month follow up         History Of Present Illness  Adore Rodriguez is a 40 year old  female who presents for evaluation and treatment of:      she is due for mammogram and labs and pap smear.    SHe wants to go back to see dermatologist. She has a hx of moles of the face... she wants evaluated.     hx of insomnia..better with the hydroxyzine.       Past Medical History  Disease Name Date Onset Notes   Alcoholism --  --    Allergies --  --    Anemia --  --    Arthritis --  --    Migraine headache --  --    Positive TB test  --    Sinus trouble --  --          Medication List  Name Date Started Instructions   hydroxyzine HCl 25 mg oral tablet 2020 take 1-2 tablets by oral route once a day (at bedtime)         Allergy List  Allergen Name Date Reaction Notes   NO KNOWN DRUG ALLERGIES --  --  --        Allergies Reconciled  Family Medical History  Disease Name Relative/Age Notes   DM Type II Uncle/   --    Leukemia Sister/   --          Reproductive History  Menstrual   Last Menstrual Period: 2018   Pregnancy Summary   Total Pregnancies: 1 Full Term: 1 Premature: 0   Ab Induced: 0 Ab Spontaneous: 0 Ectopics: 0   Multiples: 0 Livin         Social History  Finding Status Start/Stop Quantity Notes   Tobacco Never --/-- --  --          Immunizations  NameDate Admin Mfg Trade Name Lot Number Route Inj VIS Given VIS Publication   Zpxapsvdo41/ NE Not Entered  NE NE     Comments: received vaccine at work   Hoyjdehdr36/2018 NE Not Entered  NE NE     Comments: 10/2018- exact date not given   Pfopevgey74/31/2017  "SKB Fluarix, quadrivalent, preservative free [id number] NE NE 01/25/2018 07/02/2012   Comments:    MMR08/31/2017 MSD M-M-R II  NE NE 01/25/2018    Comments:    Tdap08/31/2017 SKB BOOSTRIX  NE NE 01/25/2018 01/24/2012   Comments:    Nbnybaqdo03/31/2017 MSD VARIVAX  NE NE 01/25/2018 10/14/2011   Comments:    VaricellaUnknown NE Not Entered  NE NE     Comments:          Review of Systems  · Constitutional  o Denies  o : fever, weight loss, weight gain  · Cardiovascular  o Denies  o : pedal edema, claudication, chest pressure, palpitations  · Respiratory  o Denies  o : shortness of breath, wheezing, cough, hemoptysis, dyspnea on exertion  · Gastrointestinal  o Denies  o : nausea, vomiting, diarrhea, constipation, abdominal pain      Vitals  Date Time BP Position Site L\R Cuff Size HR RR TEMP (F) WT  HT  BMI kg/m2 BSA m2 O2 Sat        06/29/2020 03:40 /52 Sitting    64 - R  98.1 143lbs 1oz 5'  5\" 23.81 1.73 95 %          Physical Examination  · Constitutional  o Appearance  o : alert, in no acute distress, well developed, well-nourished  · Head and Face  o Head  o : normocephalic, atraumatic, non tender, no palpable masses or nodules.  o Face  o : no facial lesions  · Eyes  o Vision  o : Acuity: grossly normal at distance, Conjuntivae: Normal, Sclerae white  · Respiratory  o Auscultation of Lungs  o : normal breath sounds throughout  · Cardiovascular  o Heart  o : Regular rate and rhythm, Normal S1,S2   · Skin and Subcutaneous Tissue  o General Inspection  o : flat moles/nevi face  · Psychiatric  o Mood and Affect  o : normal mood and affect          Assessment  · Anemia     285.9/D64.9  · Vitamin D deficiency     268.9/E55.9  · Breast cancer screening by mammogram     V76.12/Z12.31  · Routine lab draw     V72.60/Z01.89  · Elevated glucose     790.29/R73.09  · Screening for cardiovascular condition     V81.2/Z13.6  · Facial lesion     709.9/L98.9       refer to derm.  labs.  mammogram  next visit pap smear.    f/u as " directed       Plan  · Orders  o ACO-39: Current medications updated and reviewed () - - 06/29/2020  o ACO-14: Influenza immunization was not administered for reasons documented () - - 07/19/2020  o DERMATOLOGY CONSULTATION (DERMA) - 709.9/L98.9 - 06/29/2020   Full body exam at [facility]  o Mammogram breast screening 3D digital bilateral (12030, , 35916) - V76.12/Z12.31 - 06/29/2020  o Physical, Primary Care Panel (CBC, CMP, Lipid, TSH) [facility] (47698, 27220, 40258, 41115) - V72.60/Z01.89, 285.9/D64.9, 790.29/R73.09, V81.2/Z13.6 - 09/29/2020  o Vitamin D (25-Hydroxy) Level (23201) - 268.9/E55.9 - 09/29/2020  o Urinalysis with Reflex Microscopy if abnormal ([facility]) (72507) - V72.60/Z01.89, 790.29/R73.09 - 09/29/2020  o Iron Profile (Iron 40401 TIBC 88388 and Transferrin 66104) (IRONP) - V72.60/Z01.89, 285.9/D64.9 - 09/29/2020  o Hgb A1c [facility] (36410) - 790.29/R73.09 - 09/29/2020  · Medications  o Medications have been Reconciled  o Transition of Care or Provider Policy  · Instructions  o Patient was educated/instructed on their diagnosis, treatment and medications prior to discharge from the clinic today.  o Electronically Identified Patient Education Materials Provided Electronically  · Disposition  o Call or Return if symptoms worsen or persist.  o Care Transition            Electronically Signed by: Mika Palm MD -Author on July 19, 2020 09:45:51 AM

## 2021-05-15 VITALS
TEMPERATURE: 97.2 F | OXYGEN SATURATION: 96 % | WEIGHT: 148.12 LBS | SYSTOLIC BLOOD PRESSURE: 115 MMHG | DIASTOLIC BLOOD PRESSURE: 53 MMHG | HEART RATE: 51 BPM

## 2021-05-15 VITALS
DIASTOLIC BLOOD PRESSURE: 61 MMHG | BODY MASS INDEX: 24.2 KG/M2 | HEART RATE: 72 BPM | SYSTOLIC BLOOD PRESSURE: 110 MMHG | TEMPERATURE: 97.1 F | WEIGHT: 145.25 LBS | HEIGHT: 65 IN | OXYGEN SATURATION: 96 %

## 2021-05-15 VITALS
HEIGHT: 65 IN | SYSTOLIC BLOOD PRESSURE: 119 MMHG | TEMPERATURE: 97.3 F | BODY MASS INDEX: 24.7 KG/M2 | OXYGEN SATURATION: 97 % | HEART RATE: 62 BPM | DIASTOLIC BLOOD PRESSURE: 61 MMHG | WEIGHT: 148.25 LBS

## 2021-05-15 VITALS
HEIGHT: 65 IN | OXYGEN SATURATION: 97 % | DIASTOLIC BLOOD PRESSURE: 66 MMHG | HEART RATE: 80 BPM | TEMPERATURE: 97.9 F | BODY MASS INDEX: 23.75 KG/M2 | SYSTOLIC BLOOD PRESSURE: 109 MMHG | WEIGHT: 142.56 LBS

## 2021-05-15 VITALS
SYSTOLIC BLOOD PRESSURE: 105 MMHG | DIASTOLIC BLOOD PRESSURE: 52 MMHG | BODY MASS INDEX: 23.83 KG/M2 | HEIGHT: 65 IN | WEIGHT: 143.06 LBS | TEMPERATURE: 98.1 F | HEART RATE: 64 BPM | OXYGEN SATURATION: 95 %

## 2021-05-16 VITALS
BODY MASS INDEX: 24.52 KG/M2 | HEART RATE: 65 BPM | WEIGHT: 147.19 LBS | SYSTOLIC BLOOD PRESSURE: 104 MMHG | TEMPERATURE: 98.1 F | DIASTOLIC BLOOD PRESSURE: 58 MMHG | OXYGEN SATURATION: 96 % | HEIGHT: 65 IN

## 2021-07-11 RX ORDER — CLOTRIMAZOLE AND BETAMETHASONE DIPROPIONATE 10; .64 MG/G; MG/G
CREAM TOPICAL 2 TIMES DAILY
Qty: 15 G | Refills: 1 | Status: SHIPPED | OUTPATIENT
Start: 2021-07-11 | End: 2021-07-25

## 2021-10-11 ENCOUNTER — OFFICE VISIT (OUTPATIENT)
Dept: FAMILY MEDICINE CLINIC | Facility: CLINIC | Age: 41
End: 2021-10-11

## 2021-10-11 VITALS
SYSTOLIC BLOOD PRESSURE: 116 MMHG | DIASTOLIC BLOOD PRESSURE: 72 MMHG | WEIGHT: 143.4 LBS | RESPIRATION RATE: 18 BRPM | HEART RATE: 74 BPM | BODY MASS INDEX: 23.86 KG/M2 | TEMPERATURE: 97.2 F | OXYGEN SATURATION: 97 %

## 2021-10-11 DIAGNOSIS — D50.8 IRON DEFICIENCY ANEMIA SECONDARY TO INADEQUATE DIETARY IRON INTAKE: ICD-10-CM

## 2021-10-11 DIAGNOSIS — H93.8X2 EAR CONGESTION, LEFT: Primary | ICD-10-CM

## 2021-10-11 DIAGNOSIS — R53.83 FATIGUE, UNSPECIFIED TYPE: ICD-10-CM

## 2021-10-11 DIAGNOSIS — E55.9 VITAMIN D DEFICIENCY: ICD-10-CM

## 2021-10-11 DIAGNOSIS — Z12.31 SCREENING MAMMOGRAM, ENCOUNTER FOR: ICD-10-CM

## 2021-10-11 PROCEDURE — 99213 OFFICE O/P EST LOW 20 MIN: CPT | Performed by: FAMILY MEDICINE

## 2021-10-11 NOTE — PROGRESS NOTES
Chief Complaint   Patient presents with   • Anemia     Subjective   Adore Eastman is a 41 y.o. female who presents to the office for follow-up.   Pt presents for f/u.  She c/o left ear congestion, especially after swimming or showers. At times it causes vertigo. Denies headaches or blurry vision.    She has a hx of anemia. Due for labs end of month.    The following portions of the patient's history were reviewed and updated as appropriate: allergies, current medications, past family history, past medical history, past social history, past surgical history and problem list.    Review of Systems   Constitutional: Negative for chills, fever and unexpected weight loss.   Respiratory: Negative for cough, chest tightness and shortness of breath.    Cardiovascular: Negative for chest pain and palpitations.   Gastrointestinal: Negative for abdominal pain, constipation, diarrhea, nausea and vomiting.        Objective   Vitals:    10/11/21 1542   BP: 116/72   BP Location: Left arm   Patient Position: Sitting   Cuff Size: Adult   Pulse: 74   Resp: 18   Temp: 97.2 °F (36.2 °C)   SpO2: 97%   Weight: 65 kg (143 lb 6.4 oz)     Body mass index is 23.86 kg/m².  Physical Exam  Vitals reviewed.   Constitutional:       General: She is not in acute distress.     Appearance: Normal appearance. She is not ill-appearing.   HENT:      Head: Normocephalic.   Eyes:      Conjunctiva/sclera: Conjunctivae normal.   Cardiovascular:      Rate and Rhythm: Normal rate and regular rhythm.   Pulmonary:      Effort: Pulmonary effort is normal.      Breath sounds: Normal breath sounds.   Skin:     Findings: No lesion or rash.   Neurological:      Mental Status: She is alert and oriented to person, place, and time.   Psychiatric:         Mood and Affect: Mood normal.          Assessment/Plan   Diagnoses and all orders for this visit:    1. Ear congestion, left (Primary)    2. Screening mammogram, encounter for  -     Mammo Screening Digital  Tomosynthesis Bilateral With CAD; Future    3. Fatigue, unspecified type  -     Comprehensive Metabolic Panel; Future  -     TSH; Future  -     Vitamin B12 & Folate; Future  -     Vitamin D 25 Hydroxy; Future  -     Urinalysis With Microscopic If Indicated (No Culture) - Urine, Clean Catch; Future  -     Iron Profile; Future    4. Vitamin D deficiency  -     Vitamin D 25 Hydroxy; Future    5. Iron deficiency anemia secondary to inadequate dietary iron intake  -     CBC Auto Differential; Future  -     Comprehensive Metabolic Panel; Future  -     TSH; Future  -     Vitamin B12 & Folate; Future  -     Vitamin D 25 Hydroxy; Future  -     Urinalysis With Microscopic If Indicated (No Culture) - Urine, Clean Catch; Future  -     Iron Profile; Future           Labs she can do this month or next month  rec benadryl or sudafed for ear congestion.she can get OTC.  Pap due next year.  Mammogram due.      Return in about 6 months (around 4/11/2022).   PHQ-2/PHQ-9 Depression Screening 10/11/2021   Little interest or pleasure in doing things 0   Feeling down, depressed, or hopeless 0   Total Score 0

## 2021-11-01 ENCOUNTER — LAB (OUTPATIENT)
Dept: LAB | Facility: HOSPITAL | Age: 41
End: 2021-11-01

## 2021-11-01 DIAGNOSIS — E55.9 VITAMIN D DEFICIENCY: ICD-10-CM

## 2021-11-01 DIAGNOSIS — D50.8 IRON DEFICIENCY ANEMIA SECONDARY TO INADEQUATE DIETARY IRON INTAKE: ICD-10-CM

## 2021-11-01 DIAGNOSIS — R53.83 FATIGUE, UNSPECIFIED TYPE: ICD-10-CM

## 2021-11-01 LAB
25(OH)D3 SERPL-MCNC: 43.7 NG/ML
ALBUMIN SERPL-MCNC: 4.6 G/DL (ref 3.5–5.2)
ALBUMIN/GLOB SERPL: 1.4 G/DL
ALP SERPL-CCNC: 75 U/L (ref 39–117)
ALT SERPL W P-5'-P-CCNC: 23 U/L (ref 1–33)
ANION GAP SERPL CALCULATED.3IONS-SCNC: 11.2 MMOL/L (ref 5–15)
AST SERPL-CCNC: 22 U/L (ref 1–32)
BACTERIA UR QL AUTO: NORMAL /HPF
BASOPHILS # BLD AUTO: 0.04 10*3/MM3 (ref 0–0.2)
BASOPHILS NFR BLD AUTO: 0.8 % (ref 0–1.5)
BILIRUB SERPL-MCNC: 0.5 MG/DL (ref 0–1.2)
BILIRUB UR QL STRIP: NEGATIVE
BUN SERPL-MCNC: 16 MG/DL (ref 6–20)
BUN/CREAT SERPL: 19.8 (ref 7–25)
CALCIUM SPEC-SCNC: 9.5 MG/DL (ref 8.6–10.5)
CHLORIDE SERPL-SCNC: 104 MMOL/L (ref 98–107)
CLARITY UR: CLEAR
CO2 SERPL-SCNC: 24.8 MMOL/L (ref 22–29)
COLOR UR: YELLOW
CREAT SERPL-MCNC: 0.81 MG/DL (ref 0.57–1)
DEPRECATED RDW RBC AUTO: 43.5 FL (ref 37–54)
EOSINOPHIL # BLD AUTO: 0.31 10*3/MM3 (ref 0–0.4)
EOSINOPHIL NFR BLD AUTO: 6.4 % (ref 0.3–6.2)
ERYTHROCYTE [DISTWIDTH] IN BLOOD BY AUTOMATED COUNT: 12.5 % (ref 12.3–15.4)
FOLATE SERPL-MCNC: 19.9 NG/ML (ref 4.78–24.2)
GFR SERPL CREATININE-BSD FRML MDRD: 78 ML/MIN/1.73
GFR SERPL CREATININE-BSD FRML MDRD: 94 ML/MIN/1.73
GLOBULIN UR ELPH-MCNC: 3.3 GM/DL
GLUCOSE SERPL-MCNC: 86 MG/DL (ref 65–99)
GLUCOSE UR STRIP-MCNC: NEGATIVE MG/DL
HCT VFR BLD AUTO: 41.1 % (ref 34–46.6)
HGB BLD-MCNC: 14 G/DL (ref 12–15.9)
HGB UR QL STRIP.AUTO: NEGATIVE
HYALINE CASTS UR QL AUTO: NORMAL /LPF
IMM GRANULOCYTES # BLD AUTO: 0.01 10*3/MM3 (ref 0–0.05)
IMM GRANULOCYTES NFR BLD AUTO: 0.2 % (ref 0–0.5)
IRON 24H UR-MRATE: 114 MCG/DL (ref 37–145)
IRON SATN MFR SERPL: 31 % (ref 20–50)
KETONES UR QL STRIP: NEGATIVE
LEUKOCYTE ESTERASE UR QL STRIP.AUTO: ABNORMAL
LYMPHOCYTES # BLD AUTO: 1.5 10*3/MM3 (ref 0.7–3.1)
LYMPHOCYTES NFR BLD AUTO: 30.9 % (ref 19.6–45.3)
MCH RBC QN AUTO: 32.1 PG (ref 26.6–33)
MCHC RBC AUTO-ENTMCNC: 34.1 G/DL (ref 31.5–35.7)
MCV RBC AUTO: 94.3 FL (ref 79–97)
MONOCYTES # BLD AUTO: 0.33 10*3/MM3 (ref 0.1–0.9)
MONOCYTES NFR BLD AUTO: 6.8 % (ref 5–12)
NEUTROPHILS NFR BLD AUTO: 2.66 10*3/MM3 (ref 1.7–7)
NEUTROPHILS NFR BLD AUTO: 54.9 % (ref 42.7–76)
NITRITE UR QL STRIP: NEGATIVE
NRBC BLD AUTO-RTO: 0 /100 WBC (ref 0–0.2)
PH UR STRIP.AUTO: 7 [PH] (ref 5–8)
PLATELET # BLD AUTO: 213 10*3/MM3 (ref 140–450)
PMV BLD AUTO: 10.6 FL (ref 6–12)
POTASSIUM SERPL-SCNC: 3.9 MMOL/L (ref 3.5–5.2)
PROT SERPL-MCNC: 7.9 G/DL (ref 6–8.5)
PROT UR QL STRIP: NEGATIVE
RBC # BLD AUTO: 4.36 10*6/MM3 (ref 3.77–5.28)
RBC # UR: NORMAL /HPF
REF LAB TEST METHOD: NORMAL
SODIUM SERPL-SCNC: 140 MMOL/L (ref 136–145)
SP GR UR STRIP: 1.01 (ref 1–1.03)
SQUAMOUS #/AREA URNS HPF: NORMAL /HPF
TIBC SERPL-MCNC: 365 MCG/DL (ref 298–536)
TRANSFERRIN SERPL-MCNC: 245 MG/DL (ref 200–360)
TSH SERPL DL<=0.05 MIU/L-ACNC: 1.62 UIU/ML (ref 0.27–4.2)
UROBILINOGEN UR QL STRIP: ABNORMAL
VIT B12 BLD-MCNC: 662 PG/ML (ref 211–946)
WBC # BLD AUTO: 4.85 10*3/MM3 (ref 3.4–10.8)
WBC UR QL AUTO: NORMAL /HPF

## 2021-11-01 PROCEDURE — 36415 COLL VENOUS BLD VENIPUNCTURE: CPT

## 2021-11-01 PROCEDURE — 85025 COMPLETE CBC W/AUTO DIFF WBC: CPT

## 2021-11-01 PROCEDURE — 84443 ASSAY THYROID STIM HORMONE: CPT

## 2021-11-01 PROCEDURE — 80053 COMPREHEN METABOLIC PANEL: CPT

## 2021-11-01 PROCEDURE — 81001 URINALYSIS AUTO W/SCOPE: CPT

## 2021-11-01 PROCEDURE — 82607 VITAMIN B-12: CPT

## 2021-11-01 PROCEDURE — 83540 ASSAY OF IRON: CPT

## 2021-11-01 PROCEDURE — 84466 ASSAY OF TRANSFERRIN: CPT

## 2021-11-01 PROCEDURE — 82306 VITAMIN D 25 HYDROXY: CPT

## 2021-11-01 PROCEDURE — 82746 ASSAY OF FOLIC ACID SERUM: CPT

## 2021-12-28 ENCOUNTER — HOSPITAL ENCOUNTER (OUTPATIENT)
Dept: MAMMOGRAPHY | Facility: HOSPITAL | Age: 41
Discharge: HOME | End: 2021-12-28

## 2021-12-28 DIAGNOSIS — Z12.31 SCREENING MAMMOGRAM, ENCOUNTER FOR: ICD-10-CM

## 2022-01-06 RX ORDER — PREDNISONE 20 MG/1
20 TABLET ORAL DAILY
Qty: 5 TABLET | Refills: 0 | Status: SHIPPED | OUTPATIENT
Start: 2022-01-06 | End: 2022-04-05

## 2022-01-06 RX ORDER — AZITHROMYCIN 250 MG/1
TABLET, FILM COATED ORAL
Qty: 6 TABLET | Refills: 1 | Status: SHIPPED | OUTPATIENT
Start: 2022-01-06 | End: 2022-04-05

## 2022-02-03 ENCOUNTER — HOSPITAL ENCOUNTER (OUTPATIENT)
Dept: MAMMOGRAPHY | Facility: HOSPITAL | Age: 42
End: 2022-02-03

## 2022-02-03 ENCOUNTER — HOSPITAL ENCOUNTER (OUTPATIENT)
Dept: MAMMOGRAPHY | Facility: HOSPITAL | Age: 42
Discharge: HOME | End: 2022-02-03
Admitting: FAMILY MEDICINE

## 2022-02-03 PROCEDURE — 77067 SCR MAMMO BI INCL CAD: CPT

## 2022-02-03 PROCEDURE — 77063 BREAST TOMOSYNTHESIS BI: CPT

## 2022-04-05 ENCOUNTER — OFFICE VISIT (OUTPATIENT)
Dept: FAMILY MEDICINE CLINIC | Facility: CLINIC | Age: 42
End: 2022-04-05

## 2022-04-05 VITALS
BODY MASS INDEX: 24.84 KG/M2 | SYSTOLIC BLOOD PRESSURE: 102 MMHG | OXYGEN SATURATION: 100 % | TEMPERATURE: 98 F | HEART RATE: 76 BPM | RESPIRATION RATE: 20 BRPM | DIASTOLIC BLOOD PRESSURE: 66 MMHG | WEIGHT: 149.1 LBS | HEIGHT: 65 IN

## 2022-04-05 DIAGNOSIS — Z11.59 ENCOUNTER FOR HEPATITIS C SCREENING TEST FOR LOW RISK PATIENT: ICD-10-CM

## 2022-04-05 DIAGNOSIS — Z00.00 ANNUAL PHYSICAL EXAM: ICD-10-CM

## 2022-04-05 DIAGNOSIS — R05.9 COUGH: ICD-10-CM

## 2022-04-05 DIAGNOSIS — Z01.89 ROUTINE LAB DRAW: ICD-10-CM

## 2022-04-05 DIAGNOSIS — Z12.31 ENCOUNTER FOR SCREENING MAMMOGRAM FOR MALIGNANT NEOPLASM OF BREAST: ICD-10-CM

## 2022-04-05 DIAGNOSIS — Z13.220 SCREENING FOR LIPID DISORDERS: ICD-10-CM

## 2022-04-05 DIAGNOSIS — R09.89 CHEST CONGESTION: ICD-10-CM

## 2022-04-05 DIAGNOSIS — J01.40 ACUTE NON-RECURRENT PANSINUSITIS: Primary | ICD-10-CM

## 2022-04-05 PROBLEM — F10.20 ALCOHOLISM: Status: ACTIVE | Noted: 2022-04-05

## 2022-04-05 PROBLEM — J34.9 SINUS TROUBLE: Status: ACTIVE | Noted: 2022-04-05

## 2022-04-05 PROBLEM — D64.9 ANEMIA: Status: ACTIVE | Noted: 2022-04-05

## 2022-04-05 PROBLEM — M19.90 ARTHRITIS: Status: ACTIVE | Noted: 2022-04-05

## 2022-04-05 PROBLEM — G43.909 MIGRAINE HEADACHE: Status: ACTIVE | Noted: 2022-04-05

## 2022-04-05 PROCEDURE — 99213 OFFICE O/P EST LOW 20 MIN: CPT

## 2022-04-05 RX ORDER — PREDNISONE 20 MG/1
20 TABLET ORAL DAILY
Qty: 5 TABLET | Refills: 0 | Status: SHIPPED | OUTPATIENT
Start: 2022-04-05 | End: 2022-10-03

## 2022-04-05 RX ORDER — AZITHROMYCIN 250 MG/1
TABLET, FILM COATED ORAL
Qty: 6 TABLET | Refills: 0 | Status: SHIPPED | OUTPATIENT
Start: 2022-04-05 | End: 2022-10-03

## 2022-04-05 NOTE — PROGRESS NOTES
Adore Sherman presents to [facility] with complaints of sinus congestion/pain, cough, chest congestion, watery eyes.      History of Present Illness  This is a 41-year-old female, no significant past medical history, who presents to the clinic with complaints of sinus congestion/pain, cough, chest congestion, and watery eyes.  Patient is also here to establish as a new patient.    Patient states that her symptoms started a few days ago, states that she will have issues with her sinuses this time every year.  She even had issues back in January for this, and was treated at that time.  Patient states that she is got some pretty severe sinus congestion/pain, she does have some nasal drainage, does have some chest congestion and a productive cough.  Her phlegm is a yellowish type color, and will even make her sometimes vomit at times due to it being so thick and she cannot get it up.  Patient states that her cough is worse at night as well as her phlegm production.  Patient denies any fever/chills/body aches.  Patient denies any GI symptoms.  Patient denies any chest pain/shortness of breath.    Patient denies any other past medical history, states that she does get a yearly mammogram, her last was completed recently.  She would like the next one scheduled during the summertime.  Her next Pap smear is due in 2023, otherwise she is up-to-date on immunizations/preventative screenings.  We will repeat labs in November or at next visit.    History reviewed. No pertinent past medical history.  No past surgical history on file.    Social History     Socioeconomic History   • Marital status:    Tobacco Use   • Smoking status: Never Smoker   • Smokeless tobacco: Never Used   Vaping Use   • Vaping Use: Never used   Substance and Sexual Activity   • Alcohol use: Never   • Drug use: Never   • Sexual activity: Defer     History reviewed. No pertinent family history.      Objective  "  Vital Signs:   /66   Pulse 76   Temp 98 °F (36.7 °C)   Resp 20   Ht 165.1 cm (65\")   Wt 67.6 kg (149 lb 1.6 oz)   SpO2 100%   BMI 24.81 kg/m²     Body mass index is 24.81 kg/m².    All labs, imaging, test results, and specialty provider notes reviewed with patient.       Physical Exam  Vitals reviewed.   Constitutional:       Appearance: Normal appearance.   Cardiovascular:      Rate and Rhythm: Normal rate and regular rhythm.      Pulses: Normal pulses.      Heart sounds: Normal heart sounds.   Pulmonary:      Effort: Pulmonary effort is normal.      Breath sounds: Normal breath sounds.   Neurological:      General: No focal deficit present.      Mental Status: She is alert and oriented to person, place, and time.          Assessment and Plan:  Diagnoses and all orders for this visit:    1. Acute non-recurrent pansinusitis (Primary)  -     azithromycin (Zithromax Z-Carter) 250 MG tablet; Take 2 tablets by mouth on day 1, then 1 tablet daily on days 2-5  Dispense: 6 tablet; Refill: 0  -     predniSONE (DELTASONE) 20 MG tablet; Take 1 tablet by mouth Daily.  Dispense: 5 tablet; Refill: 0    2. Chest congestion  -     azithromycin (Zithromax Z-Carter) 250 MG tablet; Take 2 tablets by mouth on day 1, then 1 tablet daily on days 2-5  Dispense: 6 tablet; Refill: 0  -     predniSONE (DELTASONE) 20 MG tablet; Take 1 tablet by mouth Daily.  Dispense: 5 tablet; Refill: 0    3. Cough  -     azithromycin (Zithromax Z-Carter) 250 MG tablet; Take 2 tablets by mouth on day 1, then 1 tablet daily on days 2-5  Dispense: 6 tablet; Refill: 0  -     predniSONE (DELTASONE) 20 MG tablet; Take 1 tablet by mouth Daily.  Dispense: 5 tablet; Refill: 0      It does appear patient has another sinus infection, did treat with a Z-Carter as well as a 5-day course of steroids.  Instructed patient that if she is not feeling better after full course, to let me know and we can further investigate at that time.  Did also recommend for patient to " take daily Zyrtec, that she is doing already.  Patient to continue taking Flonase also.  Did instruct patient that if she has continuing issues with sinus infections, we could consider a referral to an allergist for work-up and possible allergy injections if severe enough.    Patient to follow-up in about 6 months and have lab work performed at that time.    Follow Up:  No follow-ups on file.    Patient was given instructions and counseling regarding her condition or for health maintenance advice. Please see specific information pulled into the AVS if appropriate.

## 2022-10-03 ENCOUNTER — OFFICE VISIT (OUTPATIENT)
Dept: FAMILY MEDICINE CLINIC | Facility: CLINIC | Age: 42
End: 2022-10-03

## 2022-10-03 ENCOUNTER — LAB (OUTPATIENT)
Dept: LAB | Facility: HOSPITAL | Age: 42
End: 2022-10-03

## 2022-10-03 VITALS
HEIGHT: 65 IN | OXYGEN SATURATION: 100 % | SYSTOLIC BLOOD PRESSURE: 98 MMHG | DIASTOLIC BLOOD PRESSURE: 62 MMHG | RESPIRATION RATE: 20 BRPM | HEART RATE: 62 BPM | BODY MASS INDEX: 25.59 KG/M2 | WEIGHT: 153.6 LBS | TEMPERATURE: 98 F

## 2022-10-03 DIAGNOSIS — Z11.59 ENCOUNTER FOR HEPATITIS C SCREENING TEST FOR LOW RISK PATIENT: ICD-10-CM

## 2022-10-03 DIAGNOSIS — H69.82 DYSFUNCTION OF LEFT EUSTACHIAN TUBE: Primary | ICD-10-CM

## 2022-10-03 DIAGNOSIS — Z13.220 SCREENING FOR LIPID DISORDERS: ICD-10-CM

## 2022-10-03 DIAGNOSIS — J30.2 SEASONAL ALLERGIC RHINITIS, UNSPECIFIED TRIGGER: ICD-10-CM

## 2022-10-03 DIAGNOSIS — Z01.89 ROUTINE LAB DRAW: ICD-10-CM

## 2022-10-03 DIAGNOSIS — M19.90 ARTHRITIS: ICD-10-CM

## 2022-10-03 LAB
ALBUMIN SERPL-MCNC: 4.3 G/DL (ref 3.5–5.2)
ALBUMIN/GLOB SERPL: 1.4 G/DL
ALP SERPL-CCNC: 72 U/L (ref 39–117)
ALT SERPL W P-5'-P-CCNC: 16 U/L (ref 1–33)
ANION GAP SERPL CALCULATED.3IONS-SCNC: 12.3 MMOL/L (ref 5–15)
AST SERPL-CCNC: 19 U/L (ref 1–32)
BACTERIA UR QL AUTO: ABNORMAL /HPF
BASOPHILS # BLD AUTO: 0.05 10*3/MM3 (ref 0–0.2)
BASOPHILS NFR BLD AUTO: 0.8 % (ref 0–1.5)
BILIRUB SERPL-MCNC: 0.7 MG/DL (ref 0–1.2)
BILIRUB UR QL STRIP: NEGATIVE
BUN SERPL-MCNC: 10 MG/DL (ref 6–20)
BUN/CREAT SERPL: 11.6 (ref 7–25)
CALCIUM SPEC-SCNC: 9 MG/DL (ref 8.6–10.5)
CHLORIDE SERPL-SCNC: 100 MMOL/L (ref 98–107)
CHOLEST SERPL-MCNC: 140 MG/DL (ref 0–200)
CLARITY UR: CLEAR
CO2 SERPL-SCNC: 26.7 MMOL/L (ref 22–29)
COLOR UR: YELLOW
CREAT SERPL-MCNC: 0.86 MG/DL (ref 0.57–1)
DEPRECATED RDW RBC AUTO: 42.2 FL (ref 37–54)
EGFRCR SERPLBLD CKD-EPI 2021: 86.6 ML/MIN/1.73
EOSINOPHIL # BLD AUTO: 0.37 10*3/MM3 (ref 0–0.4)
EOSINOPHIL NFR BLD AUTO: 6 % (ref 0.3–6.2)
ERYTHROCYTE [DISTWIDTH] IN BLOOD BY AUTOMATED COUNT: 12.7 % (ref 12.3–15.4)
GLOBULIN UR ELPH-MCNC: 3.1 GM/DL
GLUCOSE SERPL-MCNC: 89 MG/DL (ref 65–99)
GLUCOSE UR STRIP-MCNC: NEGATIVE MG/DL
HCT VFR BLD AUTO: 39.1 % (ref 34–46.6)
HCV AB SER DONR QL: NORMAL
HDLC SERPL-MCNC: 75 MG/DL (ref 40–60)
HGB BLD-MCNC: 13.3 G/DL (ref 12–15.9)
HGB UR QL STRIP.AUTO: NEGATIVE
HYALINE CASTS UR QL AUTO: ABNORMAL /LPF
IMM GRANULOCYTES # BLD AUTO: 0.01 10*3/MM3 (ref 0–0.05)
IMM GRANULOCYTES NFR BLD AUTO: 0.2 % (ref 0–0.5)
KETONES UR QL STRIP: ABNORMAL
LDLC SERPL CALC-MCNC: 53 MG/DL (ref 0–100)
LDLC/HDLC SERPL: 0.71 {RATIO}
LEUKOCYTE ESTERASE UR QL STRIP.AUTO: ABNORMAL
LYMPHOCYTES # BLD AUTO: 2.18 10*3/MM3 (ref 0.7–3.1)
LYMPHOCYTES NFR BLD AUTO: 35.1 % (ref 19.6–45.3)
MCH RBC QN AUTO: 31 PG (ref 26.6–33)
MCHC RBC AUTO-ENTMCNC: 34 G/DL (ref 31.5–35.7)
MCV RBC AUTO: 91.1 FL (ref 79–97)
MONOCYTES # BLD AUTO: 0.45 10*3/MM3 (ref 0.1–0.9)
MONOCYTES NFR BLD AUTO: 7.2 % (ref 5–12)
NEUTROPHILS NFR BLD AUTO: 3.15 10*3/MM3 (ref 1.7–7)
NEUTROPHILS NFR BLD AUTO: 50.7 % (ref 42.7–76)
NITRITE UR QL STRIP: NEGATIVE
NRBC BLD AUTO-RTO: 0 /100 WBC (ref 0–0.2)
PH UR STRIP.AUTO: 6 [PH] (ref 5–8)
PLATELET # BLD AUTO: 224 10*3/MM3 (ref 140–450)
PMV BLD AUTO: 10.3 FL (ref 6–12)
POTASSIUM SERPL-SCNC: 3.5 MMOL/L (ref 3.5–5.2)
PROT SERPL-MCNC: 7.4 G/DL (ref 6–8.5)
PROT UR QL STRIP: NEGATIVE
RBC # BLD AUTO: 4.29 10*6/MM3 (ref 3.77–5.28)
RBC # UR STRIP: ABNORMAL /HPF
REF LAB TEST METHOD: ABNORMAL
SODIUM SERPL-SCNC: 139 MMOL/L (ref 136–145)
SP GR UR STRIP: 1.01 (ref 1–1.03)
SQUAMOUS #/AREA URNS HPF: ABNORMAL /HPF
TRIGL SERPL-MCNC: 59 MG/DL (ref 0–150)
TSH SERPL DL<=0.05 MIU/L-ACNC: 2.18 UIU/ML (ref 0.27–4.2)
UROBILINOGEN UR QL STRIP: ABNORMAL
VLDLC SERPL-MCNC: 12 MG/DL (ref 5–40)
WBC # UR STRIP: ABNORMAL /HPF
WBC NRBC COR # BLD: 6.21 10*3/MM3 (ref 3.4–10.8)

## 2022-10-03 PROCEDURE — 87077 CULTURE AEROBIC IDENTIFY: CPT

## 2022-10-03 PROCEDURE — 80050 GENERAL HEALTH PANEL: CPT

## 2022-10-03 PROCEDURE — 81001 URINALYSIS AUTO W/SCOPE: CPT

## 2022-10-03 PROCEDURE — 80061 LIPID PANEL: CPT

## 2022-10-03 PROCEDURE — 99214 OFFICE O/P EST MOD 30 MIN: CPT

## 2022-10-03 PROCEDURE — 36415 COLL VENOUS BLD VENIPUNCTURE: CPT

## 2022-10-03 PROCEDURE — 86803 HEPATITIS C AB TEST: CPT

## 2022-10-03 PROCEDURE — 87186 SC STD MICRODIL/AGAR DIL: CPT

## 2022-10-03 PROCEDURE — 87086 URINE CULTURE/COLONY COUNT: CPT

## 2022-10-03 RX ORDER — PREDNISONE 20 MG/1
20 TABLET ORAL DAILY
Qty: 5 TABLET | Refills: 0 | Status: SHIPPED | OUTPATIENT
Start: 2022-10-03

## 2022-10-03 NOTE — PROGRESS NOTES
"Adore Sherman presents to [facility] who presents to clinic today for 6-month follow-up.      History of Present Illness  This is a 42-year-old female who presents to clinic for 6-month follow-up.    Allergic rhinitis: Patient states that she has been worsening allergies recently, states that it happens every time that the weather changes.  She is also complaining of some bilateral ear fullness/pain, states that her left ear is especially worse.  States that it feels like she needs to get it cleaned out, and is really itchy.  Also states that she has been doing saline rinses as well as taking an antihistamine, which is keeping her allergies at bay.  Denies any recent infection, but does also admit to a scratchy throat as well.  Denies any fever/chills/body aches.    Lower back pain: States there are times whenever she goes to get up after sitting in a chair for prolonged period of time that she will have some stiffness, states that it does improve when she gets up and moves around.  States that exercise does help with that as well.  Did just want to mention this.    Has no other acute complaints at this time.    We will do Pap smear and annual physical at next visit.  Patient would like to have mammogram in June, will order this.  Otherwise, patient is up-to-date on preventative screenings.    The following portions of the patient's history were personally reviewed and updated as appropriate: allergies, current medications, past medical history, past surgical history, past family history, and past social history.       Objective   Vital Signs:   BP 98/62   Pulse 62   Temp 98 °F (36.7 °C)   Resp 20   Ht 165.1 cm (65\")   Wt 69.7 kg (153 lb 9.6 oz)   SpO2 100%   BMI 25.56 kg/m²     Body mass index is 25.56 kg/m².    All labs, imaging, test results, and specialty provider notes reviewed with patient.     Physical Exam  Vitals reviewed.   Constitutional:       Appearance: Normal " appearance.   Cardiovascular:      Rate and Rhythm: Normal rate and regular rhythm.      Pulses: Normal pulses.      Heart sounds: Normal heart sounds.   Pulmonary:      Effort: Pulmonary effort is normal.      Breath sounds: Normal breath sounds.   Neurological:      General: No focal deficit present.      Mental Status: She is alert and oriented to person, place, and time.              Assessment and Plan:  Diagnoses and all orders for this visit:    1. Dysfunction of left eustachian tube (Primary)  Comments:  We will get prednisone to treat for this, discussed to continue antihistamine.  Continue nasal spray.  Orders:  -     predniSONE (DELTASONE) 20 MG tablet; Take 1 tablet by mouth Daily.  Dispense: 5 tablet; Refill: 0    2. Seasonal allergic rhinitis, unspecified trigger    3. Arthritis  Assessment & Plan:  Discussed with patient to stay active, likely her lower back pain is related to some arthritis.  If does not improve, or worsens, can consider course of physical therapy.          Follow Up:  Return in about 6 months (around 4/3/2023) for Pap Smear, Annual physical.    Patient was given instructions and counseling regarding her condition or for health maintenance advice. Please see specific information pulled into the AVS if appropriate.

## 2022-10-05 DIAGNOSIS — N30.00 ACUTE CYSTITIS WITHOUT HEMATURIA: Primary | ICD-10-CM

## 2022-10-05 LAB — BACTERIA SPEC AEROBE CULT: ABNORMAL

## 2022-10-05 RX ORDER — CEPHALEXIN 500 MG/1
500 CAPSULE ORAL 2 TIMES DAILY
Qty: 14 CAPSULE | Refills: 0 | Status: SHIPPED | OUTPATIENT
Start: 2022-10-05

## 2022-10-11 RX ORDER — ALBUTEROL SULFATE 90 UG/1
2 AEROSOL, METERED RESPIRATORY (INHALATION) EVERY 4 HOURS PRN
Qty: 8 G | Refills: 0 | Status: SHIPPED | OUTPATIENT
Start: 2022-10-11

## 2023-04-10 ENCOUNTER — OFFICE VISIT (OUTPATIENT)
Dept: FAMILY MEDICINE CLINIC | Facility: CLINIC | Age: 43
End: 2023-04-10
Payer: COMMERCIAL

## 2023-04-10 VITALS
HEIGHT: 65 IN | SYSTOLIC BLOOD PRESSURE: 96 MMHG | WEIGHT: 154.5 LBS | OXYGEN SATURATION: 100 % | HEART RATE: 88 BPM | BODY MASS INDEX: 25.74 KG/M2 | DIASTOLIC BLOOD PRESSURE: 68 MMHG | RESPIRATION RATE: 20 BRPM

## 2023-04-10 DIAGNOSIS — Z12.4 SCREENING FOR CERVICAL CANCER: Primary | ICD-10-CM

## 2023-04-10 DIAGNOSIS — Z11.3 SCREENING FOR STD (SEXUALLY TRANSMITTED DISEASE): ICD-10-CM

## 2023-04-10 DIAGNOSIS — L70.0 CYSTIC ACNE: ICD-10-CM

## 2023-04-10 DIAGNOSIS — Z12.31 ENCOUNTER FOR SCREENING MAMMOGRAM FOR MALIGNANT NEOPLASM OF BREAST: ICD-10-CM

## 2023-04-10 DIAGNOSIS — Z31.41 FERTILITY TESTING: ICD-10-CM

## 2023-04-10 DIAGNOSIS — Z00.00 ANNUAL PHYSICAL EXAM: ICD-10-CM

## 2023-04-10 LAB
CANDIDA SPECIES: NEGATIVE
GARDNERELLA VAGINALIS: POSITIVE
T VAGINALIS DNA VAG QL PROBE+SIG AMP: NEGATIVE

## 2023-04-10 PROCEDURE — 87624 HPV HI-RISK TYP POOLED RSLT: CPT

## 2023-04-10 PROCEDURE — 87510 GARDNER VAG DNA DIR PROBE: CPT

## 2023-04-10 PROCEDURE — 87660 TRICHOMONAS VAGIN DIR PROBE: CPT

## 2023-04-10 PROCEDURE — G0123 SCREEN CERV/VAG THIN LAYER: HCPCS

## 2023-04-10 PROCEDURE — 87480 CANDIDA DNA DIR PROBE: CPT

## 2023-04-10 RX ORDER — CLINDAMYCIN AND BENZOYL PEROXIDE 10; 50 MG/G; MG/G
1 GEL TOPICAL 2 TIMES DAILY
Qty: 50 G | Refills: 1 | Status: SHIPPED | OUTPATIENT
Start: 2023-04-10

## 2023-04-10 RX ORDER — TRIAMCINOLONE ACETONIDE 0.25 MG/G
1 OINTMENT TOPICAL 2 TIMES DAILY
Qty: 80 G | Refills: 1 | Status: SHIPPED | OUTPATIENT
Start: 2023-04-10

## 2023-04-10 NOTE — PROGRESS NOTES
Adore Sherman presents to [facility] with complaints of issues with cystic acne, is also here for annual physical and Pap smear.      History of Present Illness  This is a 42-year-old female who presents to the clinic with complaints of issues with her cystic acne and is also here for annual physical and Pap smear.    Cystic acne: Patient states she has been to dermatology in regards to this, states that she had an area on her back of her neck that was pretty large at 1 point, states that it has started to go down, but there is a lesion on her chest that is really bothering her.  Patient states that she tried to pop it on a few different occasions, cannot seem to get the drainage out of it, but states that it is really irritating and itchy.  Patient states that she is always had some cystic acne, issues with it, has tried oral pills which have never given her much relief.  States that she is even been on the oral pills for about a year, with no real improvement.  She is unsure of what else to really do, does try to use different creams and stuff over-the-counter which some give her some relief, but states that it is becoming a persistent problem.    Patient states that she is not currently having any vaginal issues, no vaginal discharge, no vaginal irritation or pain.  Has not had an abnormal Pap smear, her last Pap smear was several years ago.    Patient does have some interest in seeing if she is still able to have kids, states that she is got a new boyfriend, and they would like to possibly try to conceive if able.  Patient states that she is unsure if she has the problem, she has been trying to track her menstrual cycle, tracking her ovulation, but is still not been successful in getting pregnant.    We will get mammogram scheduled, otherwise patient is up-to-date on other preventative screenings.    The following portions of the patient's history were personally reviewed and  "updated as appropriate: allergies, current medications, past medical history, past surgical history, past family history, and past social history.       Objective   Vital Signs:   BP 96/68   Pulse 88   Resp 20   Ht 165.1 cm (65\")   Wt 70.1 kg (154 lb 8 oz)   SpO2 100%   BMI 25.71 kg/m²     Body mass index is 25.71 kg/m².    All labs, imaging, test results, and specialty provider notes reviewed with patient.     Physical Exam  Vitals reviewed.   Constitutional:       Appearance: Normal appearance.   Cardiovascular:      Rate and Rhythm: Normal rate and regular rhythm.      Pulses: Normal pulses.      Heart sounds: Normal heart sounds.   Pulmonary:      Effort: Pulmonary effort is normal.      Breath sounds: Normal breath sounds.   Genitourinary:     Comments: Pap smear performed  Neurological:      General: No focal deficit present.      Mental Status: She is alert and oriented to person, place, and time.                Assessment and Plan:  Diagnoses and all orders for this visit:    1. Screening for cervical cancer (Primary)  -     IgP, Aptima HPV    2. Screening for STD (sexually transmitted disease)  -     Gardnerella vaginalis, Trichomonas vaginalis, Candida albicans, DNA - Swab, Vagina    3. Cystic acne  Comments:  We will send in clindamycin gel to use, continue to follow-up with dermatology, also send in triamcinolone cream for any known open lesions may help with swelli  Orders:  -     clindamycin-benzoyl peroxide (BenzaClin) 1-5 % gel; Apply 1 application topically to the appropriate area as directed 2 (Two) Times a Day.  Dispense: 50 g; Refill: 1  -     triamcinolone (KENALOG) 0.025 % ointment; Apply 1 application topically to the appropriate area as directed 2 (Two) Times a Day.  Dispense: 80 g; Refill: 1    4. Fertility testing  Comments:  Will refer to OB/GYN for further evaluation and testing of this.  Orders:  -     Ambulatory Referral to Obstetrics / Gynecology    5. Encounter for screening " mammogram for malignant neoplasm of breast  -     Mammo Screening Digital Tomosynthesis Bilateral With CAD; Future    6. Annual physical exam      Anticipatory Guidelines discussed with patient.    Discussed getting adequate exercise, reduced TV/electronic time, car safety including seat belt use, sexual activity (if applicable), and smoking/alcohol use (if applicable).    Follow Up:  Return in about 6 months (around 10/10/2023).    Patient was given instructions and counseling regarding her condition or for health maintenance advice. Please see specific information pulled into the AVS if appropriate.

## 2023-04-11 RX ORDER — METRONIDAZOLE 500 MG/1
500 TABLET ORAL 2 TIMES DAILY
Qty: 14 TABLET | Refills: 0 | Status: SHIPPED | OUTPATIENT
Start: 2023-04-11

## 2023-04-14 LAB
CYTOLOGIST CVX/VAG CYTO: NORMAL
CYTOLOGY CVX/VAG DOC CYTO: NORMAL
CYTOLOGY CVX/VAG DOC THIN PREP: NORMAL
DX ICD CODE: NORMAL
HIV 1 & 2 AB SER-IMP: NORMAL
HPV I/H RISK 4 DNA CVX QL PROBE+SIG AMP: NEGATIVE
OTHER STN SPEC: NORMAL
STAT OF ADQ CVX/VAG CYTO-IMP: NORMAL

## 2023-06-27 ENCOUNTER — PATIENT ROUNDING (BHMG ONLY) (OUTPATIENT)
Dept: OBSTETRICS AND GYNECOLOGY | Facility: CLINIC | Age: 43
End: 2023-06-27

## 2023-06-27 NOTE — PROGRESS NOTES
A My-Chart message has been sent to the patient for PATIENT ROUNDING with [facility]

## 2023-10-09 ENCOUNTER — OFFICE VISIT (OUTPATIENT)
Dept: FAMILY MEDICINE CLINIC | Facility: CLINIC | Age: 43
End: 2023-10-09
Payer: COMMERCIAL

## 2023-10-09 VITALS
BODY MASS INDEX: 25.47 KG/M2 | TEMPERATURE: 98.6 F | SYSTOLIC BLOOD PRESSURE: 102 MMHG | OXYGEN SATURATION: 98 % | WEIGHT: 152.9 LBS | HEIGHT: 65 IN | HEART RATE: 81 BPM | DIASTOLIC BLOOD PRESSURE: 62 MMHG

## 2023-10-09 DIAGNOSIS — Z13.220 SCREENING FOR LIPID DISORDERS: ICD-10-CM

## 2023-10-09 DIAGNOSIS — E55.9 VITAMIN D DEFICIENCY: ICD-10-CM

## 2023-10-09 DIAGNOSIS — L70.0 CYSTIC ACNE: Primary | ICD-10-CM

## 2023-10-09 DIAGNOSIS — Z01.89 ROUTINE LAB DRAW: ICD-10-CM

## 2023-10-09 NOTE — PROGRESS NOTES
"Adore Sherman presents to [facility] with complaints of worsening cystic acne, spots on face, is also here for follow-up.      History of Present Illness  This is a 43-year-old female who presents to the clinic with complaints of worsening cystic acne, spots on face, and is also here for follow-up.    Cystic acne/moles on face: Patient states that she has seen dermatology in the past, but they did not give her a lot of options as far as treatment goes.  Patient states that her cystic acne is getting worse, she has spots on her chest that are large, painful, and even the creams that were provided previously are not giving her much relief.  Would like to see a dermatologist, different dermatologist, to see what other options there are to treat this.  Patient also states that she has some moles on her face, states that they seem to becoming more frequent, knows that it is a genetic trait, but is wondering if there is anything else that can be done in regards to this.    Patient otherwise feels really healthy, has been taking different vitamins, and doing well.  We will be due for blood work, already has mammogram scheduled, and is otherwise up-to-date on preventative screenings.    The following portions of the patient's history were personally reviewed and updated as appropriate: allergies, current medications, past medical history, past surgical history, past family history, and past social history.       Objective   Vital Signs:   /62 (BP Location: Left arm, Patient Position: Sitting, Cuff Size: Adult)   Pulse 81   Temp 98.6 øF (37 øC) (Temporal)   Ht 165.1 cm (65\")   Wt 69.4 kg (152 lb 14.4 oz)   SpO2 98%   BMI 25.44 kg/mý     Body mass index is 25.44 kg/mý.    All labs, imaging, test results, and specialty provider notes reviewed with patient.     Physical Exam  Vitals reviewed.   Constitutional:       Appearance: Normal appearance.   Cardiovascular:      Rate and " Rhythm: Normal rate and regular rhythm.      Pulses: Normal pulses.      Heart sounds: Normal heart sounds.   Pulmonary:      Effort: Pulmonary effort is normal.      Breath sounds: Normal breath sounds.   Neurological:      General: No focal deficit present.      Mental Status: She is alert and oriented to person, place, and time.                  BMI is >= 25 and <30. (Overweight) The following options were offered after discussion;: exercise counseling/recommendations and nutrition counseling/recommendations            Assessment and Plan:  Diagnoses and all orders for this visit:    1. Cystic acne (Primary)  Comments:  Refer to dermatology  Orders:  -     Ambulatory Referral to Dermatology    2. Vitamin D deficiency  -     Vitamin D,25-Hydroxy; Future    3. Screening for lipid disorders  -     Lipid Panel; Future    4. Routine lab draw  -     CBC Auto Differential; Future  -     Comprehensive Metabolic Panel; Future  -     TSH Rfx On Abnormal To Free T4; Future  -     Urinalysis With Culture If Indicated -; Future          Follow Up:  Return in about 1 year (around 10/9/2024) for Annual physical.    Patient was given instructions and counseling regarding her condition or for health maintenance advice. Please see specific information pulled into the AVS if appropriate.

## 2023-10-10 ENCOUNTER — LAB (OUTPATIENT)
Dept: LAB | Facility: HOSPITAL | Age: 43
End: 2023-10-10
Payer: COMMERCIAL

## 2023-10-10 DIAGNOSIS — Z13.220 SCREENING FOR LIPID DISORDERS: ICD-10-CM

## 2023-10-10 DIAGNOSIS — Z01.89 ROUTINE LAB DRAW: ICD-10-CM

## 2023-10-10 DIAGNOSIS — E55.9 VITAMIN D DEFICIENCY: ICD-10-CM

## 2023-10-10 LAB
25(OH)D3 SERPL-MCNC: 23.2 NG/ML (ref 30–100)
ALBUMIN SERPL-MCNC: 4.2 G/DL (ref 3.5–5.2)
ALBUMIN/GLOB SERPL: 1.2 G/DL
ALP SERPL-CCNC: 70 U/L (ref 39–117)
ALT SERPL W P-5'-P-CCNC: 22 U/L (ref 1–33)
ANION GAP SERPL CALCULATED.3IONS-SCNC: 9 MMOL/L (ref 5–15)
AST SERPL-CCNC: 20 U/L (ref 1–32)
BACTERIA UR QL AUTO: ABNORMAL /HPF
BASOPHILS # BLD AUTO: 0.05 10*3/MM3 (ref 0–0.2)
BASOPHILS NFR BLD AUTO: 1.1 % (ref 0–1.5)
BILIRUB SERPL-MCNC: 0.5 MG/DL (ref 0–1.2)
BILIRUB UR QL STRIP: NEGATIVE
BUN SERPL-MCNC: 13 MG/DL (ref 6–20)
BUN/CREAT SERPL: 15.3 (ref 7–25)
CALCIUM SPEC-SCNC: 9.1 MG/DL (ref 8.6–10.5)
CHLORIDE SERPL-SCNC: 103 MMOL/L (ref 98–107)
CHOLEST SERPL-MCNC: 139 MG/DL (ref 0–200)
CLARITY UR: CLEAR
CO2 SERPL-SCNC: 25 MMOL/L (ref 22–29)
COLOR UR: YELLOW
CREAT SERPL-MCNC: 0.85 MG/DL (ref 0.57–1)
DEPRECATED RDW RBC AUTO: 43.1 FL (ref 37–54)
EGFRCR SERPLBLD CKD-EPI 2021: 87.3 ML/MIN/1.73
EOSINOPHIL # BLD AUTO: 0.44 10*3/MM3 (ref 0–0.4)
EOSINOPHIL NFR BLD AUTO: 9.7 % (ref 0.3–6.2)
ERYTHROCYTE [DISTWIDTH] IN BLOOD BY AUTOMATED COUNT: 12.5 % (ref 12.3–15.4)
GLOBULIN UR ELPH-MCNC: 3.4 GM/DL
GLUCOSE SERPL-MCNC: 97 MG/DL (ref 65–99)
GLUCOSE UR STRIP-MCNC: NEGATIVE MG/DL
HCT VFR BLD AUTO: 39.3 % (ref 34–46.6)
HDLC SERPL-MCNC: 64 MG/DL (ref 40–60)
HGB BLD-MCNC: 13.3 G/DL (ref 12–15.9)
HGB UR QL STRIP.AUTO: NEGATIVE
HOLD SPECIMEN: NORMAL
HYALINE CASTS UR QL AUTO: ABNORMAL /LPF
IMM GRANULOCYTES # BLD AUTO: 0 10*3/MM3 (ref 0–0.05)
IMM GRANULOCYTES NFR BLD AUTO: 0 % (ref 0–0.5)
KETONES UR QL STRIP: NEGATIVE
LDLC SERPL CALC-MCNC: 63 MG/DL (ref 0–100)
LDLC/HDLC SERPL: 0.99 {RATIO}
LEUKOCYTE ESTERASE UR QL STRIP.AUTO: ABNORMAL
LYMPHOCYTES # BLD AUTO: 1.48 10*3/MM3 (ref 0.7–3.1)
LYMPHOCYTES NFR BLD AUTO: 32.6 % (ref 19.6–45.3)
MCH RBC QN AUTO: 32 PG (ref 26.6–33)
MCHC RBC AUTO-ENTMCNC: 33.8 G/DL (ref 31.5–35.7)
MCV RBC AUTO: 94.5 FL (ref 79–97)
MONOCYTES # BLD AUTO: 0.33 10*3/MM3 (ref 0.1–0.9)
MONOCYTES NFR BLD AUTO: 7.3 % (ref 5–12)
NEUTROPHILS NFR BLD AUTO: 2.24 10*3/MM3 (ref 1.7–7)
NEUTROPHILS NFR BLD AUTO: 49.3 % (ref 42.7–76)
NITRITE UR QL STRIP: NEGATIVE
NRBC BLD AUTO-RTO: 0 /100 WBC (ref 0–0.2)
PH UR STRIP.AUTO: 6.5 [PH] (ref 5–8)
PLATELET # BLD AUTO: 215 10*3/MM3 (ref 140–450)
PMV BLD AUTO: 10.2 FL (ref 6–12)
POTASSIUM SERPL-SCNC: 3.7 MMOL/L (ref 3.5–5.2)
PROT SERPL-MCNC: 7.6 G/DL (ref 6–8.5)
PROT UR QL STRIP: NEGATIVE
RBC # BLD AUTO: 4.16 10*6/MM3 (ref 3.77–5.28)
RBC # UR STRIP: ABNORMAL /HPF
REF LAB TEST METHOD: ABNORMAL
SODIUM SERPL-SCNC: 137 MMOL/L (ref 136–145)
SP GR UR STRIP: 1.01 (ref 1–1.03)
SQUAMOUS #/AREA URNS HPF: ABNORMAL /HPF
TRIGL SERPL-MCNC: 59 MG/DL (ref 0–150)
TSH SERPL DL<=0.05 MIU/L-ACNC: 1.25 UIU/ML (ref 0.27–4.2)
UROBILINOGEN UR QL STRIP: ABNORMAL
VLDLC SERPL-MCNC: 12 MG/DL (ref 5–40)
WBC # UR STRIP: ABNORMAL /HPF
WBC NRBC COR # BLD: 4.54 10*3/MM3 (ref 3.4–10.8)

## 2023-10-10 PROCEDURE — 80061 LIPID PANEL: CPT

## 2023-10-10 PROCEDURE — 82306 VITAMIN D 25 HYDROXY: CPT

## 2023-10-10 PROCEDURE — 80050 GENERAL HEALTH PANEL: CPT

## 2023-10-10 PROCEDURE — 36415 COLL VENOUS BLD VENIPUNCTURE: CPT

## 2023-10-10 PROCEDURE — 81001 URINALYSIS AUTO W/SCOPE: CPT

## 2023-10-26 ENCOUNTER — HOSPITAL ENCOUNTER (OUTPATIENT)
Dept: MAMMOGRAPHY | Facility: HOSPITAL | Age: 43
Discharge: HOME | End: 2023-10-26
Payer: COMMERCIAL

## 2023-10-26 DIAGNOSIS — Z12.31 ENCOUNTER FOR SCREENING MAMMOGRAM FOR MALIGNANT NEOPLASM OF BREAST: ICD-10-CM

## 2023-10-26 PROCEDURE — 77067 SCR MAMMO BI INCL CAD: CPT

## 2023-10-26 PROCEDURE — 77063 BREAST TOMOSYNTHESIS BI: CPT

## 2023-10-30 DIAGNOSIS — Z12.31 ENCOUNTER FOR SCREENING MAMMOGRAM FOR MALIGNANT NEOPLASM OF BREAST: Primary | ICD-10-CM

## 2024-09-11 ENCOUNTER — PATIENT ROUNDING (BHMG ONLY) (OUTPATIENT)
Dept: URGENT CARE | Facility: CLINIC | Age: 44
End: 2024-09-11
Payer: COMMERCIAL

## 2024-09-11 NOTE — ED NOTES
Thank you for letting us care for you in your recent visit to our urgent care center. We would love to hear about your experience with us. Was this the first time you have visited our location?    We’re always looking for ways to make our patients’ experiences even better. Do you have any recommendations on ways we may improve?     I appreciate you taking the time to respond. Please be on the lookout for a survey about your recent visit from [employer] via text or email. We would greatly appreciate if you could fill that out and turn it back in. We want your voice to be heard and we value your feedback.   Thank you for choosing [facility] for your healthcare needs.

## 2024-09-12 ENCOUNTER — OFFICE VISIT (OUTPATIENT)
Dept: FAMILY MEDICINE CLINIC | Facility: CLINIC | Age: 44
End: 2024-09-12
Payer: COMMERCIAL

## 2024-09-12 VITALS
WEIGHT: 154.8 LBS | DIASTOLIC BLOOD PRESSURE: 80 MMHG | OXYGEN SATURATION: 96 % | TEMPERATURE: 98 F | SYSTOLIC BLOOD PRESSURE: 116 MMHG | HEIGHT: 65 IN | BODY MASS INDEX: 25.79 KG/M2 | HEART RATE: 82 BPM

## 2024-09-12 DIAGNOSIS — L02.31 ABSCESS OF BUTTOCK: Primary | ICD-10-CM

## 2024-09-12 PROCEDURE — 99213 OFFICE O/P EST LOW 20 MIN: CPT

## 2024-09-12 PROCEDURE — 96372 THER/PROPH/DIAG INJ SC/IM: CPT

## 2024-09-12 NOTE — PROGRESS NOTES
"Adore Sherman presents to [facility] with complaints of abscess/wound on right buttock.      History of Present Illness  This is a 44-year-old female who presents to the clinic with complaints of abscess/wound on right buttock.    Patient states that this area has actually been irritated and there for quite some time, but states that it got to where it was extremely painful, getting more swollen, knew that it was getting infected starting last Thursday.  Patient states that she tried over-the-counter remedies with cocoa and other things, did keep an eye on this, but patient states that it got to the point where it was extremely uncomfortable and painful and actually ended up going to the urgent care on Monday.  Patient states that since then, it does appear that the wound has slightly opened up, starting to drain, but she went to come in for evaluation to make sure that it was healing appropriately.  Patient does state that the pain is improving, when she first noticed that it was getting really large she was having pain shooting down her leg and that is no longer occurring, she did still notice that the redness is starting to spread but it is a dark redness, and states that she has not had any fever/chills/body aches.  States that she just wants to make sure that she is also on the appropriate antibiotics, was prescribed cephalexin 500 mg and Bactrim 800 160 mg.    The following portions of the patient's history were personally reviewed and updated as appropriate: allergies, current medications, past medical history, past surgical history, past family history, and past social history.       Objective   Vital Signs:   /80 (BP Location: Left arm, Patient Position: Sitting, Cuff Size: Adult)   Pulse 82   Temp 98 °F (36.7 °C)   Ht 165.1 cm (65\")   Wt 70.2 kg (154 lb 12.8 oz)   SpO2 96%   BMI 25.76 kg/m²     Body mass index is 25.76 kg/m².    All labs, imaging, test " results, and specialty provider notes reviewed with patient.     Physical Exam  Vitals reviewed.   Constitutional:       Appearance: Normal appearance.   Skin:     Findings: Abscess present.             Comments: Abscess noted erythema present, drainage noted, serosanguineous, induration noted   Neurological:      General: No focal deficit present.      Mental Status: She is alert and oriented to person, place, and time.                               Assessment and Plan:  Diagnoses and all orders for this visit:    1. Abscess of buttock (Primary)  -     cefTRIAXone (ROCEPHIN) 350 mg/ml in lidocaine 1% IM syringe (1 gm vial)      Will obtain wound culture today, continue on current antibiotics as prescribed already and will also give Rocephin injection in the office today.  Based off of wound culture, may need to adjust antibiotics if needed, discussed strictly about ER precautions if patient is not seeing drastic improvement over the next 1 to 2 days of wound, may need to go to the ER for I&D, discussed about other warning signs as well.  Patient to follow-up on Monday for wound reassessment.    Follow Up:  No follow-ups on file.    Patient was given instructions and counseling regarding her condition or for health maintenance advice. Please see specific information pulled into the AVS if appropriate.

## 2024-09-16 ENCOUNTER — OFFICE VISIT (OUTPATIENT)
Dept: FAMILY MEDICINE CLINIC | Facility: CLINIC | Age: 44
End: 2024-09-16
Payer: COMMERCIAL

## 2024-09-16 ENCOUNTER — TELEPHONE (OUTPATIENT)
Dept: FAMILY MEDICINE CLINIC | Facility: CLINIC | Age: 44
End: 2024-09-16

## 2024-09-16 VITALS
TEMPERATURE: 97.9 F | WEIGHT: 153.7 LBS | DIASTOLIC BLOOD PRESSURE: 56 MMHG | HEIGHT: 65 IN | OXYGEN SATURATION: 97 % | HEART RATE: 73 BPM | SYSTOLIC BLOOD PRESSURE: 98 MMHG | BODY MASS INDEX: 25.61 KG/M2

## 2024-09-16 DIAGNOSIS — L02.31 ABSCESS AND CELLULITIS OF GLUTEAL REGION: Primary | ICD-10-CM

## 2024-09-16 DIAGNOSIS — L03.317 ABSCESS AND CELLULITIS OF GLUTEAL REGION: Primary | ICD-10-CM

## 2024-09-16 PROCEDURE — 99213 OFFICE O/P EST LOW 20 MIN: CPT

## 2024-09-16 RX ORDER — CEPHALEXIN 500 MG/1
500 CAPSULE ORAL 2 TIMES DAILY
Qty: 6 CAPSULE | Refills: 0 | Status: SHIPPED | OUTPATIENT
Start: 2024-09-16

## 2024-10-14 ENCOUNTER — TELEPHONE (OUTPATIENT)
Dept: FAMILY MEDICINE CLINIC | Facility: CLINIC | Age: 44
End: 2024-10-14

## 2024-10-14 NOTE — TELEPHONE ENCOUNTER
[email or web address]  Reaching out to reschedule appointment from morning of 10/14 due to provider being out of office.   Hub - transfer to office.

## 2024-10-24 ENCOUNTER — OFFICE VISIT (OUTPATIENT)
Dept: FAMILY MEDICINE CLINIC | Facility: CLINIC | Age: 44
End: 2024-10-24
Payer: COMMERCIAL

## 2024-10-24 VITALS
HEIGHT: 65 IN | OXYGEN SATURATION: 98 % | SYSTOLIC BLOOD PRESSURE: 102 MMHG | HEART RATE: 77 BPM | DIASTOLIC BLOOD PRESSURE: 60 MMHG | TEMPERATURE: 97.6 F | BODY MASS INDEX: 25.47 KG/M2 | WEIGHT: 152.9 LBS

## 2024-10-24 DIAGNOSIS — N95.1 MENOPAUSAL SYMPTOMS: ICD-10-CM

## 2024-10-24 DIAGNOSIS — L65.9 HAIR LOSS: ICD-10-CM

## 2024-10-24 DIAGNOSIS — N92.6 IRREGULAR MENSTRUAL CYCLE: ICD-10-CM

## 2024-10-24 DIAGNOSIS — Z13.220 SCREENING FOR LIPID DISORDERS: ICD-10-CM

## 2024-10-24 DIAGNOSIS — Z00.00 ANNUAL PHYSICAL EXAM: Primary | ICD-10-CM

## 2024-10-24 PROCEDURE — 99396 PREV VISIT EST AGE 40-64: CPT

## 2024-10-24 PROCEDURE — 99214 OFFICE O/P EST MOD 30 MIN: CPT

## 2024-10-24 NOTE — PROGRESS NOTES
Adore Sherman presents to [facility] with complaints of irregular menstrual cycle, abnormal vaginal discharge, concern for possible pregnancy, hair loss.      History of Present Illness  This is a 44-year-old female who presents to the clinic with complaints of irregular menstrual cycle, abnormal vaginal discharge, concern for possible pregnancy, hair loss, and is here for annual physical.    Patient states that this past month to month and a half has been really weird for her menstrual cycle.  Patient states that she had a normal menstrual cycle that started on September 3, and then this most recent menstrual cycle that started on October 22 was very different.  Patient states that it was abnormal and different color, she had pain associated with it, and a day or 2 prior to that she was having quite a few symptoms that are different for her.  States that she was having some pelvic sharp pain, was having change in mood, dizziness, and just did not feel right.  Thought that she might have been pregnant.  States that she is trying to get pregnant currently and was hopeful that she was and then she started her actual menstrual cycle on October 22.  States that she does still have concerns that she could still be pregnant although she has not taken any test she has not had any access to that, but she is not really sure or if it could be something else underlying.  When asking further, patient does state that her mother went through menopause at around age 45, but states that she feels like she is too young to go through with that for right now.    Patient also states that she has noticed this bald spot on her scalp that her hairdresser noticed first.  Patient states that she does not have any pain, does not itch, does not cause any irritation, but she just notices it and does feel like it is getting bigger.  Does currently follow-up with dermatology but has not brought this to their  "attention recently, she has not had any other changes and she does take supplements for hair loss over-the-counter.  Does not have any family history of alopecia.    Refuses vaccine/immunizations but otherwise up-to-date on other preventative screenings    The following portions of the patient's history were personally reviewed and updated as appropriate: allergies, current medications, past medical history, past surgical history, past family history, and past social history.       Objective   Vital Signs:   /60 (BP Location: Left arm, Patient Position: Sitting, Cuff Size: Adult)   Pulse 77   Temp 97.6 °F (36.4 °C)   Ht 165.1 cm (65\")   Wt 69.4 kg (152 lb 14.4 oz)   SpO2 98%   BMI 25.44 kg/m²     Body mass index is 25.44 kg/m².    All labs, imaging, test results, and specialty provider notes reviewed with patient.     Physical Exam  Vitals reviewed.   Constitutional:       Appearance: Normal appearance.   Cardiovascular:      Rate and Rhythm: Normal rate and regular rhythm.      Pulses: Normal pulses.      Heart sounds: Normal heart sounds.   Pulmonary:      Effort: Pulmonary effort is normal.      Breath sounds: Normal breath sounds.   Neurological:      General: No focal deficit present.      Mental Status: She is alert and oriented to person, place, and time.                Assessment and Plan:  Diagnoses and all orders for this visit:    1. Annual physical exam (Primary)  -     CBC Auto Differential; Future  -     Comprehensive Metabolic Panel; Future  -     TSH Rfx On Abnormal To Free T4; Future  -     Urinalysis With Culture If Indicated -; Future    2. Screening for lipid disorders  -     Lipid Panel; Future    3. Irregular menstrual cycle  Comments:  Unlikely pregnant, but will test regardless.  Test for early perimenopause, question that is being patient's reasoning.  Further evaluation if needed.  Orders:  -     hCG, Quantitative, Pregnancy; Future  -     Follicle stimulating hormone; Future  - "     Luteinizing hormone; Future    4. Menopausal symptoms  -     Follicle stimulating hormone; Future  -     Luteinizing hormone; Future    5. Hair loss  Comments:  Patient follow-up and discussed with dermatology.  Will obtain blood work and rule out other cause.  Orders:  -     Vitamin B12 & Folate; Future    Anticipatory Guidelines discussed with patient.     Discussed getting adequate exercise, reduced TV/electronic time, car safety including seat belt use, sexual activity (if applicable), and smoking/alcohol use (if applicable).      Follow Up:  Return in about 1 year (around 10/24/2025) for Annual physical.    Patient was given instructions and counseling regarding her condition or for health maintenance advice. Please see specific information pulled into the AVS if appropriate.

## 2024-11-04 ENCOUNTER — LAB (OUTPATIENT)
Dept: LAB | Facility: HOSPITAL | Age: 44
End: 2024-11-04
Payer: COMMERCIAL

## 2024-11-04 DIAGNOSIS — N95.1 MENOPAUSAL SYMPTOMS: ICD-10-CM

## 2024-11-04 DIAGNOSIS — L65.9 HAIR LOSS: ICD-10-CM

## 2024-11-04 DIAGNOSIS — N92.6 IRREGULAR MENSTRUAL CYCLE: ICD-10-CM

## 2024-11-04 DIAGNOSIS — Z00.00 ANNUAL PHYSICAL EXAM: ICD-10-CM

## 2024-11-04 DIAGNOSIS — Z13.220 SCREENING FOR LIPID DISORDERS: ICD-10-CM

## 2024-11-04 LAB
ALBUMIN SERPL-MCNC: 4 G/DL (ref 3.5–5.2)
ALBUMIN/GLOB SERPL: 1 G/DL
ALP SERPL-CCNC: 80 U/L (ref 39–117)
ALT SERPL W P-5'-P-CCNC: 21 U/L (ref 1–33)
ANION GAP SERPL CALCULATED.3IONS-SCNC: 11.8 MMOL/L (ref 5–15)
AST SERPL-CCNC: 20 U/L (ref 1–32)
BACTERIA UR QL AUTO: NORMAL /HPF
BASOPHILS # BLD AUTO: 0.03 10*3/MM3 (ref 0–0.2)
BASOPHILS NFR BLD AUTO: 0.4 % (ref 0–1.5)
BILIRUB SERPL-MCNC: 0.4 MG/DL (ref 0–1.2)
BILIRUB UR QL STRIP: NEGATIVE
BUN SERPL-MCNC: 8 MG/DL (ref 6–20)
BUN/CREAT SERPL: 10 (ref 7–25)
CALCIUM SPEC-SCNC: 9 MG/DL (ref 8.6–10.5)
CHLORIDE SERPL-SCNC: 102 MMOL/L (ref 98–107)
CHOLEST SERPL-MCNC: 132 MG/DL (ref 0–200)
CLARITY UR: CLEAR
CO2 SERPL-SCNC: 25.2 MMOL/L (ref 22–29)
COD CRY URNS QL: NORMAL /HPF
COLOR UR: YELLOW
CREAT SERPL-MCNC: 0.8 MG/DL (ref 0.57–1)
DEPRECATED RDW RBC AUTO: 44 FL (ref 37–54)
EGFRCR SERPLBLD CKD-EPI 2021: 93.3 ML/MIN/1.73
EOSINOPHIL # BLD AUTO: 0.42 10*3/MM3 (ref 0–0.4)
EOSINOPHIL NFR BLD AUTO: 6.1 % (ref 0.3–6.2)
ERYTHROCYTE [DISTWIDTH] IN BLOOD BY AUTOMATED COUNT: 12.5 % (ref 12.3–15.4)
FOLATE SERPL-MCNC: 15.8 NG/ML (ref 4.78–24.2)
FSH SERPL-ACNC: 5.35 MIU/ML
GLOBULIN UR ELPH-MCNC: 3.9 GM/DL
GLUCOSE SERPL-MCNC: 83 MG/DL (ref 65–99)
GLUCOSE UR STRIP-MCNC: NEGATIVE MG/DL
HCG INTACT+B SERPL-ACNC: <1 MIU/ML
HCT VFR BLD AUTO: 41.1 % (ref 34–46.6)
HDLC SERPL-MCNC: 63 MG/DL (ref 40–60)
HGB BLD-MCNC: 13.8 G/DL (ref 12–15.9)
HGB UR QL STRIP.AUTO: NEGATIVE
HOLD SPECIMEN: NORMAL
HYALINE CASTS UR QL AUTO: NORMAL /LPF
IMM GRANULOCYTES # BLD AUTO: 0.02 10*3/MM3 (ref 0–0.05)
IMM GRANULOCYTES NFR BLD AUTO: 0.3 % (ref 0–0.5)
KETONES UR QL STRIP: ABNORMAL
LDLC SERPL CALC-MCNC: 57 MG/DL (ref 0–100)
LDLC/HDLC SERPL: 0.93 {RATIO}
LEUKOCYTE ESTERASE UR QL STRIP.AUTO: ABNORMAL
LH SERPL-ACNC: 14.6 MIU/ML
LYMPHOCYTES # BLD AUTO: 1.82 10*3/MM3 (ref 0.7–3.1)
LYMPHOCYTES NFR BLD AUTO: 26.6 % (ref 19.6–45.3)
MCH RBC QN AUTO: 32.4 PG (ref 26.6–33)
MCHC RBC AUTO-ENTMCNC: 33.6 G/DL (ref 31.5–35.7)
MCV RBC AUTO: 96.5 FL (ref 79–97)
MONOCYTES # BLD AUTO: 0.46 10*3/MM3 (ref 0.1–0.9)
MONOCYTES NFR BLD AUTO: 6.7 % (ref 5–12)
NEUTROPHILS NFR BLD AUTO: 4.1 10*3/MM3 (ref 1.7–7)
NEUTROPHILS NFR BLD AUTO: 59.9 % (ref 42.7–76)
NITRITE UR QL STRIP: NEGATIVE
NRBC BLD AUTO-RTO: 0 /100 WBC (ref 0–0.2)
PH UR STRIP.AUTO: 6 [PH] (ref 5–8)
PLATELET # BLD AUTO: 213 10*3/MM3 (ref 140–450)
PMV BLD AUTO: 10.7 FL (ref 6–12)
POTASSIUM SERPL-SCNC: 3.5 MMOL/L (ref 3.5–5.2)
PROT SERPL-MCNC: 7.9 G/DL (ref 6–8.5)
PROT UR QL STRIP: ABNORMAL
RBC # BLD AUTO: 4.26 10*6/MM3 (ref 3.77–5.28)
RBC # UR STRIP: NORMAL /HPF
REF LAB TEST METHOD: NORMAL
SODIUM SERPL-SCNC: 139 MMOL/L (ref 136–145)
SP GR UR STRIP: 1.02 (ref 1–1.03)
SQUAMOUS #/AREA URNS HPF: NORMAL /HPF
TRIGL SERPL-MCNC: 53 MG/DL (ref 0–150)
TSH SERPL DL<=0.05 MIU/L-ACNC: 0.88 UIU/ML (ref 0.27–4.2)
UROBILINOGEN UR QL STRIP: ABNORMAL
VIT B12 BLD-MCNC: 758 PG/ML (ref 211–946)
VLDLC SERPL-MCNC: 12 MG/DL (ref 5–40)
WBC # UR STRIP: NORMAL /HPF
WBC NRBC COR # BLD AUTO: 6.85 10*3/MM3 (ref 3.4–10.8)

## 2024-11-04 PROCEDURE — 83002 ASSAY OF GONADOTROPIN (LH): CPT

## 2024-11-04 PROCEDURE — 83001 ASSAY OF GONADOTROPIN (FSH): CPT

## 2024-11-04 PROCEDURE — 80061 LIPID PANEL: CPT

## 2024-11-04 PROCEDURE — 36415 COLL VENOUS BLD VENIPUNCTURE: CPT

## 2024-11-04 PROCEDURE — 81001 URINALYSIS AUTO W/SCOPE: CPT

## 2024-11-04 PROCEDURE — 82746 ASSAY OF FOLIC ACID SERUM: CPT

## 2024-11-04 PROCEDURE — 80050 GENERAL HEALTH PANEL: CPT

## 2024-11-04 PROCEDURE — 84702 CHORIONIC GONADOTROPIN TEST: CPT

## 2024-11-04 PROCEDURE — 82607 VITAMIN B-12: CPT

## 2025-02-26 ENCOUNTER — TRANSCRIBE ORDERS (OUTPATIENT)
Dept: ADMINISTRATIVE | Facility: HOSPITAL | Age: 45
End: 2025-02-26
Payer: COMMERCIAL

## 2025-02-26 ENCOUNTER — HOSPITAL ENCOUNTER (OUTPATIENT)
Dept: MAMMOGRAPHY | Facility: HOSPITAL | Age: 45
Discharge: HOME | End: 2025-02-26
Payer: COMMERCIAL

## 2025-02-26 DIAGNOSIS — Z12.31 SCREENING MAMMOGRAM, ENCOUNTER FOR: ICD-10-CM

## 2025-02-26 DIAGNOSIS — R92.8 ABNORMAL MAMMOGRAM OF LEFT BREAST: Primary | ICD-10-CM

## 2025-02-26 DIAGNOSIS — Z12.31 SCREENING MAMMOGRAM, ENCOUNTER FOR: Primary | ICD-10-CM

## 2025-02-26 PROCEDURE — 77067 SCR MAMMO BI INCL CAD: CPT

## 2025-02-26 PROCEDURE — 77063 BREAST TOMOSYNTHESIS BI: CPT

## 2025-03-31 ENCOUNTER — HOSPITAL ENCOUNTER (OUTPATIENT)
Dept: ULTRASOUND IMAGING | Facility: HOSPITAL | Age: 45
Discharge: HOME | End: 2025-03-31
Payer: COMMERCIAL

## 2025-03-31 ENCOUNTER — TELEPHONE (OUTPATIENT)
Dept: FAMILY MEDICINE CLINIC | Facility: CLINIC | Age: 45
End: 2025-03-31
Payer: COMMERCIAL

## 2025-03-31 ENCOUNTER — HOSPITAL ENCOUNTER (OUTPATIENT)
Dept: MAMMOGRAPHY | Facility: HOSPITAL | Age: 45
Discharge: HOME | End: 2025-03-31
Payer: COMMERCIAL

## 2025-03-31 DIAGNOSIS — R92.8 ABNORMAL MAMMOGRAM OF LEFT BREAST: ICD-10-CM

## 2025-03-31 PROCEDURE — 77065 DX MAMMO INCL CAD UNI: CPT

## 2025-03-31 PROCEDURE — G0279 TOMOSYNTHESIS, MAMMO: HCPCS

## 2025-03-31 PROCEDURE — 76642 ULTRASOUND BREAST LIMITED: CPT

## 2025-03-31 NOTE — TELEPHONE ENCOUNTER
Per Ashish, she received call from Radiology that patient declined to schedule biopsy. She requested that we reach out to patient to see if she has follow up questions. It is her recommendation that she complete the biopsy due to the findings.     Attempted to call Adore, her phone is not accepting calls at this time.

## 2025-05-16 ENCOUNTER — APPOINTMENT (OUTPATIENT)
Dept: CT IMAGING | Facility: HOSPITAL | Age: 45
End: 2025-05-16
Payer: COMMERCIAL

## 2025-05-16 ENCOUNTER — HOSPITAL ENCOUNTER (EMERGENCY)
Facility: HOSPITAL | Age: 45
Discharge: HOME | End: 2025-05-16
Attending: EMERGENCY MEDICINE
Payer: COMMERCIAL

## 2025-05-16 VITALS
RESPIRATION RATE: 20 BRPM | OXYGEN SATURATION: 100 % | WEIGHT: 151.01 LBS | TEMPERATURE: 97.7 F | DIASTOLIC BLOOD PRESSURE: 81 MMHG | HEART RATE: 61 BPM | HEIGHT: 64 IN | SYSTOLIC BLOOD PRESSURE: 114 MMHG | BODY MASS INDEX: 25.78 KG/M2
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
## 2025-05-16 PROCEDURE — 99284 EMERGENCY DEPT VISIT MOD MDM: CPT

## 2025-05-16 PROCEDURE — 25010000002 KETOROLAC TROMETHAMINE PER 15 MG

## 2025-05-16 PROCEDURE — 70450 CT HEAD/BRAIN W/O DYE: CPT

## 2025-05-16 PROCEDURE — 72125 CT NECK SPINE W/O DYE: CPT

## 2025-05-16 PROCEDURE — 96372 THER/PROPH/DIAG INJ SC/IM: CPT

## 2025-05-16 RX ORDER — CYCLOBENZAPRINE HCL 10 MG
10 TABLET ORAL 3 TIMES DAILY
Qty: 20 TABLET | Refills: 0 | Status: SHIPPED | OUTPATIENT
Start: 2025-05-16

## 2025-05-16 RX ORDER — IBUPROFEN 800 MG/1
800 TABLET, FILM COATED ORAL EVERY 6 HOURS PRN
Qty: 20 TABLET | Refills: 0 | Status: SHIPPED | OUTPATIENT
Start: 2025-05-16

## 2025-05-16 RX ORDER — KETOROLAC TROMETHAMINE 30 MG/ML
30 INJECTION, SOLUTION INTRAMUSCULAR; INTRAVENOUS ONCE
Status: COMPLETED | OUTPATIENT
Start: 2025-05-16 | End: 2025-05-16

## 2025-05-16 RX ADMIN — KETOROLAC TROMETHAMINE 30 MG: 30 INJECTION, SOLUTION INTRAMUSCULAR; INTRAVENOUS at 11:20

## 2025-05-16 NOTE — Clinical Note
[facility] EMERGENCY ROOM  [address] [place] [address]  Phone: [phone]  Fax: [phone]    Adore Sherman was seen and treated in our emergency department on 5/16/2025.  She may return to school on 05/16/2025.          Thank you for choosing [facility].    Rhonda Washington RN
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
## 2025-05-16 NOTE — Clinical Note
[facility] EMERGENCY ROOM  [address] [place] [address]  Phone: [phone]  Fax: [phone]    Rosie Sherman accompanied Adore Sherman to the emergency department on 5/16/2025. They may return to school on 05/16/2025.          Thank you for choosing [facility].      Evan Serrano, DO

## 2025-05-16 NOTE — DISCHARGE INSTRUCTIONS
Please follow with your primary care provider as needed.  Return to the ED for any new or worsening concerning symptoms.
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
## 2025-05-16 NOTE — Clinical Note
[facility] EMERGENCY ROOM  [address]  [place] [address]  Phone: [phone]  Fax: [phone]    Adore Sherman was seen and treated in our emergency department on 5/16/2025.  She may return to work on 05/16/2025.         Thank you for choosing [facility].    Marilu Chu, APRN

## 2025-07-23 DIAGNOSIS — L65.9 HAIR LOSS: Primary | ICD-10-CM

## 2025-07-23 DIAGNOSIS — L70.0 CYSTIC ACNE: ICD-10-CM
